# Patient Record
Sex: FEMALE | Race: BLACK OR AFRICAN AMERICAN | Employment: FULL TIME | URBAN - METROPOLITAN AREA
[De-identification: names, ages, dates, MRNs, and addresses within clinical notes are randomized per-mention and may not be internally consistent; named-entity substitution may affect disease eponyms.]

---

## 2017-01-24 RX ORDER — NORGESTIMATE-ETHINYL ESTRADIOL 7DAYSX3 28
TABLET ORAL
Qty: 28 TABLET | Refills: 2 | Status: SHIPPED | OUTPATIENT
Start: 2017-01-24 | End: 2017-04-21

## 2017-04-20 RX ORDER — NORGESTIMATE AND ETHINYL ESTRADIOL 7DAYSX3 28
1 KIT ORAL
Qty: 28 TABLET | Refills: 2 | Status: CANCELLED | OUTPATIENT
Start: 2017-04-20

## 2017-04-21 ENCOUNTER — HOSPITAL ENCOUNTER (OUTPATIENT)
Age: 22
Discharge: HOME | End: 2017-04-21
Attending: FAMILY MEDICINE
Payer: COMMERCIAL

## 2017-04-21 VITALS
OXYGEN SATURATION: 100 % | SYSTOLIC BLOOD PRESSURE: 148 MMHG | HEIGHT: 63.75 IN | HEART RATE: 105 BPM | DIASTOLIC BLOOD PRESSURE: 100 MMHG | RESPIRATION RATE: 16 BRPM | TEMPERATURE: 98 F | WEIGHT: 245 LBS | BODY MASS INDEX: 42.34 KG/M2

## 2017-04-21 DIAGNOSIS — R53.83 OTHER FATIGUE: ICD-10-CM

## 2017-04-21 DIAGNOSIS — K00.6 ERUPTION, TEETH, DISTURBANCE OF: Primary | ICD-10-CM

## 2017-04-21 DIAGNOSIS — R68.84 JAW PAIN: ICD-10-CM

## 2017-04-21 DIAGNOSIS — R03.0 ELEVATED BLOOD PRESSURE READING WITHOUT DIAGNOSIS OF HYPERTENSION: ICD-10-CM

## 2017-04-21 PROCEDURE — 81025 URINE PREGNANCY TEST: CPT | Performed by: FAMILY MEDICINE

## 2017-04-21 PROCEDURE — 99214 OFFICE O/P EST MOD 30 MIN: CPT

## 2017-04-21 PROCEDURE — 85025 COMPLETE CBC W/AUTO DIFF WBC: CPT | Performed by: FAMILY MEDICINE

## 2017-04-21 PROCEDURE — 87086 URINE CULTURE/COLONY COUNT: CPT | Performed by: FAMILY MEDICINE

## 2017-04-21 PROCEDURE — 81002 URINALYSIS NONAUTO W/O SCOPE: CPT | Performed by: FAMILY MEDICINE

## 2017-04-21 PROCEDURE — 80047 BASIC METABLC PNL IONIZED CA: CPT

## 2017-04-21 PROCEDURE — 36415 COLL VENOUS BLD VENIPUNCTURE: CPT

## 2017-04-21 RX ORDER — NORGESTIMATE-ETHINYL ESTRADIOL 7DAYSX3 28
TABLET ORAL
Qty: 28 TABLET | Refills: 11 | Status: SHIPPED | OUTPATIENT
Start: 2017-04-21 | End: 2018-03-22

## 2017-04-21 NOTE — TELEPHONE ENCOUNTER
Called the pt and advised script was sent- and she is due for a pap- she states that she made the appt today  Future Appointments  Date Time Provider Ahsan Mann   5/22/2017 3:20 PM Milana Odom MD [facility] ONEL Hanna

## 2017-04-21 NOTE — ED INITIAL ASSESSMENT (HPI)
Patient has had jaw pain to her right jaw for the past several days and now feels pressure to that side of her face. No fevers. The dizziness is more like extreme tiredness.

## 2017-04-21 NOTE — TELEPHONE ENCOUNTER
Last refilled on 1/24/17 for # 28 with 2 rf. Last seen on 4/8/16. No future appointments. Thank you.

## 2017-04-21 NOTE — ED PROVIDER NOTES
Patient Seen in: [id number] [facility] [place]    History   Patient presents with:  Jaw Pain  Dizziness (neurologic)    Stated Complaint: right ear jaw pain/headache/dizzy    HPI  20-year-old female coming in with complaints of right ear pain/jaw pain, 97.9 °F (36.6 °C)   Temp src 04/21/17 1258 Temporal   SpO2 04/21/17 1258 100 %   O2 Device 04/21/17 1258 None (Room air)       Current:/100 mmHg  Pulse 105  Temp(Src) 97.9 °F (36.6 °C) (Temporal)  Resp 16  Ht 161.9 cm (5' 3.75\")  Wt 111.131 kg  BMI and father with severe HTN and now on dialysis at 50years old from poorly controlled hypertension, I am concerned about her genetic predisposition as well. Discussed with patient her risks and lab work obtained today as well.      Both her blood pressures

## 2017-05-22 ENCOUNTER — OFFICE VISIT (OUTPATIENT)
Dept: FAMILY MEDICINE CLINIC | Facility: CLINIC | Age: 22
End: 2017-05-22

## 2017-05-22 VITALS
TEMPERATURE: 98 F | HEIGHT: 64.5 IN | WEIGHT: 245 LBS | HEART RATE: 104 BPM | DIASTOLIC BLOOD PRESSURE: 88 MMHG | RESPIRATION RATE: 14 BRPM | BODY MASS INDEX: 41.32 KG/M2 | SYSTOLIC BLOOD PRESSURE: 122 MMHG

## 2017-05-22 DIAGNOSIS — Z12.4 CERVICAL CANCER SCREENING: ICD-10-CM

## 2017-05-22 DIAGNOSIS — Z11.3 ROUTINE SCREENING FOR STI (SEXUALLY TRANSMITTED INFECTION): ICD-10-CM

## 2017-05-22 DIAGNOSIS — E66.01 MORBID OBESITY WITH BMI OF 40.0-44.9, ADULT (HCC): ICD-10-CM

## 2017-05-22 DIAGNOSIS — R73.03 PREDIABETES: ICD-10-CM

## 2017-05-22 DIAGNOSIS — Z00.00 ROUTINE HISTORY AND PHYSICAL EXAMINATION OF ADULT: Primary | ICD-10-CM

## 2017-05-22 DIAGNOSIS — E78.00 PURE HYPERCHOLESTEROLEMIA: ICD-10-CM

## 2017-05-22 DIAGNOSIS — Z23 NEED FOR VACCINATION: ICD-10-CM

## 2017-05-22 PROCEDURE — 36415 COLL VENOUS BLD VENIPUNCTURE: CPT | Performed by: FAMILY MEDICINE

## 2017-05-22 PROCEDURE — 99395 PREV VISIT EST AGE 18-39: CPT | Performed by: FAMILY MEDICINE

## 2017-05-22 PROCEDURE — 90651 9VHPV VACCINE 2/3 DOSE IM: CPT | Performed by: FAMILY MEDICINE

## 2017-05-22 PROCEDURE — 88175 CYTOPATH C/V AUTO FLUID REDO: CPT | Performed by: FAMILY MEDICINE

## 2017-05-22 PROCEDURE — 90471 IMMUNIZATION ADMIN: CPT | Performed by: FAMILY MEDICINE

## 2017-05-22 PROCEDURE — 80061 LIPID PANEL: CPT | Performed by: FAMILY MEDICINE

## 2017-05-22 PROCEDURE — 83036 HEMOGLOBIN GLYCOSYLATED A1C: CPT | Performed by: FAMILY MEDICINE

## 2017-05-22 NOTE — PROGRESS NOTES
HPI:   Cleaster Lanes is a 24year old female who presents for a complete physical exam.  Patient complains of nothing major, feeling well. Has been sexually active with the same talia for some time. No gyne concerns. No hx of PAP, no hx of STIs.     Wt Read heartburn, no nausea, no changes in BMs, no blood in stool  : denies dysuria, vaginal discharge or itching, periods fairly regular  MUSCULOSKELETAL: denies back pain, no joint pains or swelling  NEURO: denies headaches, no syncope or near syncope  PSYCHE agrees to the plan. The patient is asked to return for CPX in 1 year.   Routine history and physical examination of adult  (primary encounter diagnosis)  Cervical cancer screening  Prediabetes  Pure hypercholesterolemia  Routine screening for sti (sexually

## 2017-05-23 PROBLEM — E66.01 MORBID OBESITY WITH BMI OF 40.0-44.9, ADULT (HCC): Status: ACTIVE | Noted: 2017-05-23

## 2017-05-25 ENCOUNTER — TELEPHONE (OUTPATIENT)
Dept: FAMILY MEDICINE CLINIC | Facility: CLINIC | Age: 22
End: 2017-05-25

## 2017-05-25 RX ORDER — AZITHROMYCIN 1 G
1 PACKET (EA) ORAL ONCE
Qty: 1 EACH | Refills: 0 | Status: SHIPPED | OUTPATIENT
Start: 2017-05-25 | End: 2017-05-25

## 2017-05-25 NOTE — TELEPHONE ENCOUNTER
Health department form filled in.   Will need to complete treatment date and fax after patient received rocephin

## 2017-05-25 NOTE — TELEPHONE ENCOUNTER
Patient notified of results and MD instructions and verbalized understanding. Patient aware these are treatable infections. Discussed condom use to prevent future infections.   Patient aware no sex until 2 weeks after both she and her partner have receive

## 2017-05-25 NOTE — TELEPHONE ENCOUNTER
----- Message from Roselyn Mock MD sent at 5/24/2017 11:26 PM CDT -----  Please notify pt she tested positive for 2 STIs, gonorrhea and chlamydia. Thankfully these are treatable, but it is imperative she use condoms to try to prevent future infections.  Ne

## 2017-05-26 ENCOUNTER — NURSE ONLY (OUTPATIENT)
Dept: FAMILY MEDICINE CLINIC | Facility: CLINIC | Age: 22
End: 2017-05-26

## 2017-05-26 DIAGNOSIS — A64 STI (SEXUALLY TRANSMITTED INFECTION): Primary | ICD-10-CM

## 2017-05-26 PROCEDURE — 96372 THER/PROPH/DIAG INJ SC/IM: CPT | Performed by: FAMILY MEDICINE

## 2017-05-26 RX ORDER — METRONIDAZOLE 500 MG/1
TABLET ORAL
Qty: 4 TABLET | Refills: 0 | Status: SHIPPED | OUTPATIENT
Start: 2017-05-26 | End: 2017-10-22 | Stop reason: ALTCHOICE

## 2017-05-26 RX ORDER — CEFTRIAXONE SODIUM 250 MG/1
250 INJECTION, POWDER, FOR SOLUTION INTRAMUSCULAR; INTRAVENOUS ONCE
Status: COMPLETED | OUTPATIENT
Start: 2017-05-26 | End: 2017-05-26

## 2017-05-26 RX ADMIN — CEFTRIAXONE SODIUM 250 MG: 250 INJECTION, POWDER, FOR SOLUTION INTRAMUSCULAR; INTRAVENOUS at 15:59:00

## 2017-05-26 NOTE — PROGRESS NOTES
Injection given, advised of the + trichomonas and the meds. Verbalized understanding. Information handouts given on all 3 diseases. Reminded that she needs to contact her partner, and he will need treatment also.  She states he has [employer], I gave her

## 2017-05-26 NOTE — TELEPHONE ENCOUNTER
----- Message from Wilder Vega MD sent at 5/25/2017  6:59 PM CDT -----  Please notify pt her PAP is normal and we can repeat the PAP in 2 years, HOWEVER, the PAP shows she has another treatable STI called trichomonas.  She needs another abx to treat this,

## 2017-05-30 ENCOUNTER — TELEPHONE (OUTPATIENT)
Dept: FAMILY MEDICINE CLINIC | Facility: CLINIC | Age: 22
End: 2017-05-30

## 2017-05-30 NOTE — TELEPHONE ENCOUNTER
Called [facility] back and spoke with Maya Zamora and advised of the meds and diagnosis for this pt.  Advised that I will refax the form that we filled out- she  V/u

## 2017-10-20 ENCOUNTER — HOSPITAL ENCOUNTER (OUTPATIENT)
Age: 22
Discharge: HOME | End: 2017-10-20
Payer: COMMERCIAL

## 2017-10-20 ENCOUNTER — TELEPHONE (OUTPATIENT)
Dept: FAMILY MEDICINE CLINIC | Facility: CLINIC | Age: 22
End: 2017-10-20

## 2017-10-20 VITALS
DIASTOLIC BLOOD PRESSURE: 94 MMHG | SYSTOLIC BLOOD PRESSURE: 133 MMHG | BODY MASS INDEX: 39.87 KG/M2 | WEIGHT: 225 LBS | RESPIRATION RATE: 16 BRPM | HEART RATE: 118 BPM | OXYGEN SATURATION: 98 % | HEIGHT: 63 IN | TEMPERATURE: 98 F

## 2017-10-20 DIAGNOSIS — Z20.2 CONTACT WITH AND (SUSPECTED) EXPOSURE TO INFECTIONS WITH A PREDOMINANTLY SEXUAL MODE OF TRANSMISSION: Primary | ICD-10-CM

## 2017-10-20 PROCEDURE — 87591 N.GONORRHOEAE DNA AMP PROB: CPT | Performed by: NURSE PRACTITIONER

## 2017-10-20 PROCEDURE — 87086 URINE CULTURE/COLONY COUNT: CPT | Performed by: NURSE PRACTITIONER

## 2017-10-20 PROCEDURE — 87491 CHLMYD TRACH DNA AMP PROBE: CPT | Performed by: NURSE PRACTITIONER

## 2017-10-20 PROCEDURE — 99214 OFFICE O/P EST MOD 30 MIN: CPT

## 2017-10-20 PROCEDURE — 81002 URINALYSIS NONAUTO W/O SCOPE: CPT | Performed by: NURSE PRACTITIONER

## 2017-10-20 PROCEDURE — 81025 URINE PREGNANCY TEST: CPT | Performed by: NURSE PRACTITIONER

## 2017-10-20 PROCEDURE — 87510 GARDNER VAG DNA DIR PROBE: CPT | Performed by: NURSE PRACTITIONER

## 2017-10-20 PROCEDURE — 87660 TRICHOMONAS VAGIN DIR PROBE: CPT | Performed by: NURSE PRACTITIONER

## 2017-10-20 PROCEDURE — 87480 CANDIDA DNA DIR PROBE: CPT | Performed by: NURSE PRACTITIONER

## 2017-10-20 NOTE — ED PROVIDER NOTES
[place] [id number] [facility] [place]   Patient presents with:  Eval-G (gynecologic)    Stated Complaint: private issue    20-year-old female presents today with concerns for STD.   Patient states will have been tested and treated ab and time. She appears well-developed. HENT:   Head: Normocephalic. Neck: Normal range of motion. Neck supple. Pulmonary/Chest: Effort normal.   Abdominal: Soft. Genitourinary: There is no rash or tenderness on the right labia.  There is no rash or t

## 2017-10-20 NOTE — TELEPHONE ENCOUNTER
Patient said a few months ago she came in for her pap and was diagnosed with multiple STDs. She said her boyfriend recently came for a visit and now she is concerned about STDs again.  She said she was treated previous and is not having any symptoms but wou

## 2017-10-20 NOTE — ED INITIAL ASSESSMENT (HPI)
Had postitive sti 3 months ago and was treated with antibiotics for chlamydia/gonnorhea and bv. Also her partner was treated.  Since then symptoms have resolved, but has had unprotected sex with same partner and he went to get tested again on Wednesday and

## 2017-10-22 RX ORDER — METRONIDAZOLE 500 MG/1
500 TABLET ORAL 2 TIMES DAILY
Qty: 14 TABLET | Refills: 0 | Status: SHIPPED | OUTPATIENT
Start: 2017-10-22 | End: 2017-10-29

## 2017-10-22 NOTE — ED NOTES
Message   Received:  Today   Message Contents   Snow Herrera MD  P Os Ic Results             Results reviewed.  Patient positive bacterial vaginosis.  Metronidazole 500 mg twice daily ×7 days called to the pharmacy. [facility] inform patient to  pre

## 2017-10-27 ENCOUNTER — OFFICE VISIT (OUTPATIENT)
Dept: FAMILY MEDICINE CLINIC | Facility: CLINIC | Age: 22
End: 2017-10-27

## 2017-10-27 VITALS
BODY MASS INDEX: 46 KG/M2 | HEART RATE: 68 BPM | DIASTOLIC BLOOD PRESSURE: 70 MMHG | WEIGHT: 258 LBS | TEMPERATURE: 98 F | SYSTOLIC BLOOD PRESSURE: 124 MMHG

## 2017-10-27 DIAGNOSIS — Z09 FOLLOW-UP EXAM: Primary | ICD-10-CM

## 2017-10-27 DIAGNOSIS — Z91.89 AT RISK FOR SEXUALLY TRANSMITTED DISEASE DUE TO UNPROTECTED SEX: ICD-10-CM

## 2017-10-27 PROCEDURE — 90651 9VHPV VACCINE 2/3 DOSE IM: CPT | Performed by: FAMILY MEDICINE

## 2017-10-27 PROCEDURE — 90471 IMMUNIZATION ADMIN: CPT | Performed by: FAMILY MEDICINE

## 2017-10-27 PROCEDURE — 99213 OFFICE O/P EST LOW 20 MIN: CPT | Performed by: FAMILY MEDICINE

## 2017-10-27 NOTE — PROGRESS NOTES
Rivka Olszewski is a 25year old female. HPI:   Pt is here to f/u on STI testing. She tested positive for GC/Chlam 5/2017, was treated (as was her partner). She had not come back for re-testing as recommended.  Then a few weeks ago her partner up and sa tenderness  EXTREMITIES: no cyanosis, clubbing or edema    ASSESSMENT AND PLAN:   Diagnoses and all orders for this visit:    Follow-up exam    At risk for sexually transmitted disease due to unprotected sex  -reviewed with pt her negative GC/Chlam/trich t

## 2018-03-22 NOTE — TELEPHONE ENCOUNTER
Last refill on Trinessa #28 with 11 refills on 4 21 2017 . Last OV on 10 27 2017.   No future appointments scheduled

## 2018-03-23 RX ORDER — NORGESTIMATE-ETHINYL ESTRADIOL 7DAYSX3 28
TABLET ORAL
Qty: 28 TABLET | Refills: 11 | Status: SHIPPED | OUTPATIENT
Start: 2018-03-23 | End: 2019-02-27

## 2019-02-27 NOTE — TELEPHONE ENCOUNTER
Last refill of Trinessa - 3/23/18 - #28 with 11 refills  Last office visit - 10/27/17  Last pap - 5/22/17  No future appointments.

## 2019-02-28 RX ORDER — NORGESTIMATE AND ETHINYL ESTRADIOL 7DAYSX3 28
KIT ORAL
Qty: 28 TABLET | Refills: 1 | Status: SHIPPED | OUTPATIENT
Start: 2019-02-28 | End: 2019-03-30

## 2019-03-01 NOTE — TELEPHONE ENCOUNTER
Patient has been notified, verbalized understanding of information. Denies further questions.     Pt states she would ideally like to schedule an appt on a Saturday for pap, I did schedule appt for 4- at 3pm but if Dr. Zackary Moura opens up a Saturday she wo

## 2019-03-04 NOTE — TELEPHONE ENCOUNTER
[address] next Saturday in office is 3/30/19 - would it be okay to schedule PAP on that day? Would you like 15 or 30 min?

## 2019-03-04 NOTE — TELEPHONE ENCOUNTER
Patient notified and rescheduled appt.     Future Appointments   Date Time Provider Ahsan Mann   3/30/2019  8:30 AM Cecille Winkler MD [facility] Artemio Andrews

## 2019-03-30 ENCOUNTER — OFFICE VISIT (OUTPATIENT)
Dept: FAMILY MEDICINE CLINIC | Facility: CLINIC | Age: 24
End: 2019-03-30
Payer: COMMERCIAL

## 2019-03-30 VITALS
BODY MASS INDEX: 44.86 KG/M2 | SYSTOLIC BLOOD PRESSURE: 112 MMHG | HEIGHT: 64.5 IN | OXYGEN SATURATION: 98 % | DIASTOLIC BLOOD PRESSURE: 76 MMHG | TEMPERATURE: 97 F | WEIGHT: 266 LBS | HEART RATE: 84 BPM

## 2019-03-30 DIAGNOSIS — E66.01 MORBID OBESITY WITH BMI OF 40.0-44.9, ADULT (HCC): ICD-10-CM

## 2019-03-30 DIAGNOSIS — R73.03 PREDIABETES: ICD-10-CM

## 2019-03-30 DIAGNOSIS — Z13.220 LIPID SCREENING: ICD-10-CM

## 2019-03-30 DIAGNOSIS — Z12.4 CERVICAL CANCER SCREENING: ICD-10-CM

## 2019-03-30 DIAGNOSIS — Z00.00 ROUTINE HISTORY AND PHYSICAL EXAMINATION OF ADULT: Primary | ICD-10-CM

## 2019-03-30 DIAGNOSIS — Z13.21 ENCOUNTER FOR VITAMIN DEFICIENCY SCREENING: ICD-10-CM

## 2019-03-30 DIAGNOSIS — Z13.29 THYROID DISORDER SCREEN: ICD-10-CM

## 2019-03-30 DIAGNOSIS — Z23 NEED FOR VACCINATION: ICD-10-CM

## 2019-03-30 DIAGNOSIS — Z13.0 SCREENING, ANEMIA, DEFICIENCY, IRON: ICD-10-CM

## 2019-03-30 DIAGNOSIS — Z63.8 STRESS DUE TO FAMILY TENSION: ICD-10-CM

## 2019-03-30 DIAGNOSIS — Z11.3 ROUTINE SCREENING FOR STI (SEXUALLY TRANSMITTED INFECTION): ICD-10-CM

## 2019-03-30 PROCEDURE — 99395 PREV VISIT EST AGE 18-39: CPT | Performed by: FAMILY MEDICINE

## 2019-03-30 PROCEDURE — 90471 IMMUNIZATION ADMIN: CPT | Performed by: FAMILY MEDICINE

## 2019-03-30 PROCEDURE — 83036 HEMOGLOBIN GLYCOSYLATED A1C: CPT | Performed by: FAMILY MEDICINE

## 2019-03-30 PROCEDURE — 87591 N.GONORRHOEAE DNA AMP PROB: CPT | Performed by: FAMILY MEDICINE

## 2019-03-30 PROCEDURE — 88175 CYTOPATH C/V AUTO FLUID REDO: CPT | Performed by: FAMILY MEDICINE

## 2019-03-30 PROCEDURE — 36415 COLL VENOUS BLD VENIPUNCTURE: CPT | Performed by: FAMILY MEDICINE

## 2019-03-30 PROCEDURE — 87491 CHLMYD TRACH DNA AMP PROBE: CPT | Performed by: FAMILY MEDICINE

## 2019-03-30 PROCEDURE — 80050 GENERAL HEALTH PANEL: CPT | Performed by: FAMILY MEDICINE

## 2019-03-30 PROCEDURE — 80061 LIPID PANEL: CPT | Performed by: FAMILY MEDICINE

## 2019-03-30 PROCEDURE — 90715 TDAP VACCINE 7 YRS/> IM: CPT | Performed by: FAMILY MEDICINE

## 2019-03-30 PROCEDURE — 82306 VITAMIN D 25 HYDROXY: CPT | Performed by: FAMILY MEDICINE

## 2019-03-30 RX ORDER — NORGESTIMATE AND ETHINYL ESTRADIOL 7DAYSX3 28
1 KIT ORAL
Qty: 3 PACKAGE | Refills: 3 | Status: SHIPPED | OUTPATIENT
Start: 2019-03-30 | End: 2020-03-20

## 2019-03-30 SDOH — SOCIAL STABILITY - SOCIAL INSECURITY: OTHER SPECIFIED PROBLEMS RELATED TO PRIMARY SUPPORT GROUP: Z63.8

## 2019-03-30 NOTE — PROGRESS NOTES
HPI:   Cleaster Lanes is a 21year old female who presents for a complete physical exam.      Patient complains of nothing new, started her period yesterday.     Relationship status: monogamous dating relationship    Kids: none, has 2 siblings    Eating ha denies headaches, no syncope or near syncope  PSYCHE: denies overt depression, but more thinks about their relationship and how poor it is  HEMATOLOGIC: no bruising or noted lymph nodes    EXAM:   /76   Pulse 84   Temp 96.6 °F (35.9 °C) (Temporal) THINPREP PAP WITH HPV REFLEX REQUEST B  - THINPREP PAP WITH HPV REFLEX REQUEST; Future  - THINPREP PAP WITH HPV REFLEX REQUEST    3. Morbid obesity with BMI of 40.0-44.9, adult ([facility] [address]  Spent much of visit discussing this and importance of weight loss    4.

## 2019-04-01 ENCOUNTER — TELEPHONE (OUTPATIENT)
Dept: FAMILY MEDICINE CLINIC | Facility: CLINIC | Age: 24
End: 2019-04-01

## 2019-04-01 DIAGNOSIS — E55.9 VITAMIN D DEFICIENCY: ICD-10-CM

## 2019-04-01 DIAGNOSIS — R73.09 ELEVATED HEMOGLOBIN A1C: Primary | ICD-10-CM

## 2019-04-01 DIAGNOSIS — E78.00 ELEVATED CHOLESTEROL: ICD-10-CM

## 2019-04-01 RX ORDER — ERGOCALCIFEROL 1.25 MG/1
50000 CAPSULE ORAL WEEKLY
Qty: 12 CAPSULE | Refills: 0 | Status: SHIPPED | OUTPATIENT
Start: 2019-04-01 | End: 2019-05-01

## 2019-04-01 NOTE — TELEPHONE ENCOUNTER
----- Message from Louann Bustillo MD sent at 3/31/2019 10:59 PM CDT -----  Please notify patient her blood sugar is the highest it's been and she is neraly diabetic. Her cholestrol is also quite high. Vitamin D low.   Take prescription ergocalciferol 50,00

## 2019-04-01 NOTE — TELEPHONE ENCOUNTER
Patient advised. Verbalizes understanding. Future orders placed. Medication sent to pharmacy. Patient states she will call back if decides she needs assistance with weight loss.

## 2019-04-05 ENCOUNTER — TELEPHONE (OUTPATIENT)
Dept: FAMILY MEDICINE CLINIC | Facility: CLINIC | Age: 24
End: 2019-04-05

## 2019-04-05 NOTE — PROGRESS NOTES
Please notify patient PAP is normal, we can repeat in 2 years. Gonorrhea/chlamydia neg. Let me know if any questions.

## 2019-04-05 NOTE — TELEPHONE ENCOUNTER
----- Message from Breanna Leos MD sent at 4/5/2019 12:07 PM CDT -----  Please notify patient PAP is normal, we can repeat in 2 years. Gonorrhea/chlamydia neg. Let me know if any questions.

## 2019-04-24 ENCOUNTER — TELEPHONE (OUTPATIENT)
Dept: FAMILY MEDICINE CLINIC | Facility: CLINIC | Age: 24
End: 2019-04-24

## 2019-04-24 NOTE — TELEPHONE ENCOUNTER
Called the pharmacy and Spoke with  [place] and she is getting it ready for the pt      Pt advised

## 2019-07-01 RX ORDER — ERGOCALCIFEROL 1.25 MG/1
CAPSULE ORAL
Qty: 12 CAPSULE | Refills: 0 | OUTPATIENT
Start: 2019-07-01

## 2019-07-01 NOTE — TELEPHONE ENCOUNTER
Spoke with the pt and advised that she is due for vitamin D level prior to refilling this medication- we scheduled her a nurse visit  Future Appointments   Date Time Provider Ahsan Mann   7/13/2019  8:30 AM  [facility] [address] EMG Fallon Aguilera

## 2019-07-26 ENCOUNTER — TELEPHONE (OUTPATIENT)
Dept: FAMILY MEDICINE CLINIC | Facility: CLINIC | Age: 24
End: 2019-07-26

## 2019-08-03 ENCOUNTER — NURSE ONLY (OUTPATIENT)
Dept: FAMILY MEDICINE CLINIC | Facility: CLINIC | Age: 24
End: 2019-08-03
Payer: COMMERCIAL

## 2019-08-03 DIAGNOSIS — E55.9 VITAMIN D DEFICIENCY: ICD-10-CM

## 2019-08-03 LAB — VIT D+METAB SERPL-MCNC: 42.4 NG/ML (ref 30–100)

## 2019-08-03 PROCEDURE — 36415 COLL VENOUS BLD VENIPUNCTURE: CPT | Performed by: FAMILY MEDICINE

## 2019-08-03 PROCEDURE — 82306 VITAMIN D 25 HYDROXY: CPT | Performed by: FAMILY MEDICINE

## 2019-08-03 NOTE — PROGRESS NOTES
Cleaster Lanes presents today for nurse visit. Labs ordered by Dr César Mayo.  1 gold drawn from right ac area with 1 attempt with straight needle. Left office in stable condition.

## 2019-08-06 ENCOUNTER — TELEPHONE (OUTPATIENT)
Dept: FAMILY MEDICINE CLINIC | Facility: CLINIC | Age: 24
End: 2019-08-06

## 2019-08-06 RX ORDER — ERGOCALCIFEROL 1.25 MG/1
50000 CAPSULE ORAL
Qty: 6 CAPSULE | Refills: 3 | Status: SHIPPED | OUTPATIENT
Start: 2019-08-06 | End: 2019-11-04

## 2019-08-06 NOTE — TELEPHONE ENCOUNTER
Left message for the pt with the labs results and the recommendations- asked her to call back if she wants the prescription dose of the vitamin D

## 2019-08-06 NOTE — TELEPHONE ENCOUNTER
Pt called back and would like the prescription dose sent to the pharmacy    [facility] and [address]

## 2019-08-06 NOTE — TELEPHONE ENCOUNTER
----- Message from Nani Rizo MD sent at 8/6/2019  7:09 AM CDT -----  Vitamin D looks great now. For maintenance can either continue the scrxipt but at every other week or go to OTC Vit D3 taking 3000-4000IU daily.   When you buy a supplement make sure

## 2019-09-17 NOTE — TELEPHONE ENCOUNTER
Spoke with the pt and asked her what type if appt she is looking for since she had STI testing done today- she states that the nurse at the  told her that she needs to follow up with Dr. Radha Palomino as they don't do all the STI testing    I made her a follow up child(reddy)

## 2020-02-10 ENCOUNTER — OFFICE VISIT (OUTPATIENT)
Dept: FAMILY MEDICINE CLINIC | Facility: CLINIC | Age: 25
End: 2020-02-10
Payer: COMMERCIAL

## 2020-02-10 VITALS
TEMPERATURE: 98 F | DIASTOLIC BLOOD PRESSURE: 74 MMHG | SYSTOLIC BLOOD PRESSURE: 128 MMHG | BODY MASS INDEX: 43 KG/M2 | OXYGEN SATURATION: 95 % | HEART RATE: 91 BPM | RESPIRATION RATE: 18 BRPM | WEIGHT: 256.13 LBS

## 2020-02-10 DIAGNOSIS — E78.00 ELEVATED CHOLESTEROL: ICD-10-CM

## 2020-02-10 DIAGNOSIS — R42 DIZZINESS: ICD-10-CM

## 2020-02-10 DIAGNOSIS — R73.09 ELEVATED HEMOGLOBIN A1C: ICD-10-CM

## 2020-02-10 DIAGNOSIS — R52 BODY ACHES: ICD-10-CM

## 2020-02-10 DIAGNOSIS — R11.0 NAUSEA: ICD-10-CM

## 2020-02-10 DIAGNOSIS — R63.1 INCREASED THIRST: ICD-10-CM

## 2020-02-10 DIAGNOSIS — R35.0 INCREASED FREQUENCY OF URINATION: ICD-10-CM

## 2020-02-10 DIAGNOSIS — N64.4 BREAST TENDERNESS: ICD-10-CM

## 2020-02-10 DIAGNOSIS — N92.6 IRREGULAR PERIODS: Primary | ICD-10-CM

## 2020-02-10 LAB
ALBUMIN SERPL-MCNC: 3.8 G/DL (ref 3.4–5)
ALBUMIN/GLOB SERPL: 0.7 {RATIO} (ref 1–2)
ALP LIVER SERPL-CCNC: 76 U/L (ref 37–98)
ALT SERPL-CCNC: 42 U/L (ref 13–56)
ANION GAP SERPL CALC-SCNC: 3 MMOL/L (ref 0–18)
AST SERPL-CCNC: 34 U/L (ref 15–37)
B-HCG SERPL-ACNC: <1 MIU/ML
BASOPHILS # BLD AUTO: 0.06 X10(3) UL (ref 0–0.2)
BASOPHILS NFR BLD AUTO: 0.9 %
BILIRUB SERPL-MCNC: 0.7 MG/DL (ref 0.1–2)
BUN BLD-MCNC: 13 MG/DL (ref 7–18)
BUN/CREAT SERPL: 13.7 (ref 10–20)
CALCIUM BLD-MCNC: 9.8 MG/DL (ref 8.5–10.1)
CHLORIDE SERPL-SCNC: 107 MMOL/L (ref 98–112)
CHOLEST SMN-MCNC: 212 MG/DL (ref ?–200)
CO2 SERPL-SCNC: 28 MMOL/L (ref 21–32)
CREAT BLD-MCNC: 0.95 MG/DL (ref 0.55–1.02)
DEPRECATED RDW RBC AUTO: 40.7 FL (ref 35.1–46.3)
EOSINOPHIL # BLD AUTO: 0.19 X10(3) UL (ref 0–0.7)
EOSINOPHIL NFR BLD AUTO: 3 %
ERYTHROCYTE [DISTWIDTH] IN BLOOD BY AUTOMATED COUNT: 13.2 % (ref 11–15)
GLOBULIN PLAS-MCNC: 5.3 G/DL (ref 2.8–4.4)
GLUCOSE BLD-MCNC: 92 MG/DL (ref 70–99)
HCT VFR BLD AUTO: 42.3 % (ref 35–48)
HDLC SERPL-MCNC: 39 MG/DL (ref 40–59)
HGB BLD-MCNC: 13 G/DL (ref 12–16)
IMM GRANULOCYTES # BLD AUTO: 0.01 X10(3) UL (ref 0–1)
IMM GRANULOCYTES NFR BLD: 0.2 %
LDLC SERPL CALC-MCNC: 128 MG/DL (ref ?–100)
LYMPHOCYTES # BLD AUTO: 2.33 X10(3) UL (ref 1–4)
LYMPHOCYTES NFR BLD AUTO: 36.8 %
M PROTEIN MFR SERPL ELPH: 9.1 G/DL (ref 6.4–8.2)
MCH RBC QN AUTO: 26.1 PG (ref 26–34)
MCHC RBC AUTO-ENTMCNC: 30.7 G/DL (ref 31–37)
MCV RBC AUTO: 84.8 FL (ref 80–100)
MONOCYTES # BLD AUTO: 0.94 X10(3) UL (ref 0.1–1)
MONOCYTES NFR BLD AUTO: 14.8 %
NEUTROPHILS # BLD AUTO: 2.81 X10 (3) UL (ref 1.5–7.7)
NEUTROPHILS # BLD AUTO: 2.81 X10(3) UL (ref 1.5–7.7)
NEUTROPHILS NFR BLD AUTO: 44.3 %
NONHDLC SERPL-MCNC: 173 MG/DL (ref ?–130)
OSMOLALITY SERPL CALC.SUM OF ELEC: 286 MOSM/KG (ref 275–295)
PATIENT FASTING Y/N/NP: NO
PATIENT FASTING Y/N/NP: NO
PLATELET # BLD AUTO: 367 10(3)UL (ref 150–450)
POTASSIUM SERPL-SCNC: 4.7 MMOL/L (ref 3.5–5.1)
RBC # BLD AUTO: 4.99 X10(6)UL (ref 3.8–5.3)
SODIUM SERPL-SCNC: 138 MMOL/L (ref 136–145)
TRIGL SERPL-MCNC: 224 MG/DL (ref 30–149)
VLDLC SERPL CALC-MCNC: 45 MG/DL (ref 0–30)
WBC # BLD AUTO: 6.3 X10(3) UL (ref 4–11)

## 2020-02-10 PROCEDURE — 85025 COMPLETE CBC W/AUTO DIFF WBC: CPT | Performed by: FAMILY MEDICINE

## 2020-02-10 PROCEDURE — 84702 CHORIONIC GONADOTROPIN TEST: CPT | Performed by: FAMILY MEDICINE

## 2020-02-10 PROCEDURE — 83036 HEMOGLOBIN GLYCOSYLATED A1C: CPT | Performed by: FAMILY MEDICINE

## 2020-02-10 PROCEDURE — 36415 COLL VENOUS BLD VENIPUNCTURE: CPT | Performed by: FAMILY MEDICINE

## 2020-02-10 PROCEDURE — 99214 OFFICE O/P EST MOD 30 MIN: CPT | Performed by: FAMILY MEDICINE

## 2020-02-10 PROCEDURE — 80061 LIPID PANEL: CPT | Performed by: FAMILY MEDICINE

## 2020-02-10 PROCEDURE — 80053 COMPREHEN METABOLIC PANEL: CPT | Performed by: FAMILY MEDICINE

## 2020-02-10 NOTE — PROGRESS NOTES
Fransico Gamboa is a 25year old female. HPI:   Patient c/o irregular periods. Reports period typically comes every month on a Wed or Thurs (Jan 2nd).  This most recent period came on Saturday Feb 1st, light at first, Sunday-Tues was heavy, Wed it stopp rashes  RESPIRATORY: denies shortness of breath with exertion  CARDIOVASCULAR: denies chest pain on exertion  GI: denies abdominal pain and denies heartburn  NEURO: denies headaches    EXAM:   /74   Pulse 91   Temp 97.7 °F (36.5 °C) (Temporal)   Resp patient indicates understanding of these issues and agrees to the plan. The patient is asked to return pending results.

## 2020-02-11 ENCOUNTER — TELEPHONE (OUTPATIENT)
Dept: FAMILY MEDICINE CLINIC | Facility: CLINIC | Age: 25
End: 2020-02-11

## 2020-02-11 DIAGNOSIS — R77.9 ELEVATED BLOOD PROTEIN: Primary | ICD-10-CM

## 2020-02-11 LAB
EST. AVERAGE GLUCOSE BLD GHB EST-MCNC: 131 MG/DL (ref 68–126)
HBA1C MFR BLD HPLC: 6.2 % (ref ?–5.7)

## 2020-02-11 NOTE — TELEPHONE ENCOUNTER
Spoke with the pt and advised that Dr. Beverly Conteh has not addressed her labs and that she will have her results in the next 48 hours.   She v/u

## 2020-02-11 NOTE — TELEPHONE ENCOUNTER
Please notify patient her pregnancy test is NEGATIVE.   Blood sugar still elevated in prediabetes range, cholesterol still elevated but better than last check, blood counts normal, kidneys, liver,electrolytes normal.    Her protein level is still elevated,

## 2020-02-11 NOTE — TELEPHONE ENCOUNTER
Spoke with the pt and advised of the lab results and recommendations  She v/u  NV scheduled  Future Appointments   Date Time Provider Ahsan Mann   3/7/2020  8:30 AM  [facility] [address] EM[facility] Alvin Levin

## 2020-03-07 ENCOUNTER — NURSE ONLY (OUTPATIENT)
Dept: FAMILY MEDICINE CLINIC | Facility: CLINIC | Age: 25
End: 2020-03-07
Payer: COMMERCIAL

## 2020-03-07 DIAGNOSIS — R77.9 ELEVATED BLOOD PROTEIN: ICD-10-CM

## 2020-03-07 LAB
HBV CORE AB SERPL QL IA: NONREACTIVE
HBV SURFACE AB SER QL: NONREACTIVE
HBV SURFACE AB SERPL IA-ACNC: <3.1 MIU/ML
HBV SURFACE AG SER-ACNC: <0.1 [IU]/L
HBV SURFACE AG SERPL QL IA: NONREACTIVE
HCV AB SERPL QL IA: NONREACTIVE
T PALLIDUM AB SER QL IA: NONREACTIVE

## 2020-03-07 PROCEDURE — 84165 PROTEIN E-PHORESIS SERUM: CPT | Performed by: FAMILY MEDICINE

## 2020-03-07 PROCEDURE — 86803 HEPATITIS C AB TEST: CPT | Performed by: FAMILY MEDICINE

## 2020-03-07 PROCEDURE — 86334 IMMUNOFIX E-PHORESIS SERUM: CPT | Performed by: FAMILY MEDICINE

## 2020-03-07 PROCEDURE — 86780 TREPONEMA PALLIDUM: CPT | Performed by: FAMILY MEDICINE

## 2020-03-07 PROCEDURE — 87389 HIV-1 AG W/HIV-1&-2 AB AG IA: CPT | Performed by: FAMILY MEDICINE

## 2020-03-07 PROCEDURE — 86706 HEP B SURFACE ANTIBODY: CPT | Performed by: FAMILY MEDICINE

## 2020-03-07 PROCEDURE — 87340 HEPATITIS B SURFACE AG IA: CPT | Performed by: FAMILY MEDICINE

## 2020-03-07 PROCEDURE — 86704 HEP B CORE ANTIBODY TOTAL: CPT | Performed by: FAMILY MEDICINE

## 2020-03-07 PROCEDURE — 83883 ASSAY NEPHELOMETRY NOT SPEC: CPT | Performed by: FAMILY MEDICINE

## 2020-03-07 PROCEDURE — 36415 COLL VENOUS BLD VENIPUNCTURE: CPT | Performed by: FAMILY MEDICINE

## 2020-03-07 NOTE — PROGRESS NOTES
Patient presented today for lab draw.  5 gold, 1 pink tube(s) drawn from right ac area x1 with straight needle. Patient tolerated well.

## 2020-03-09 LAB
KAPPA FREE LIGHT CHAIN: 2.31 MG/DL (ref 0.33–1.94)
KAPPA/LAMBDA FLC RATIO: 1.54 (ref 0.26–1.65)
LAMBDA FREE LIGHT CHAIN: 1.5 MG/DL (ref 0.57–2.63)

## 2020-03-10 LAB
ALBUMIN SERPL ELPH-MCNC: 4.35 G/DL (ref 3.75–5.21)
ALBUMIN/GLOB SERPL: 1 {RATIO} (ref 1–2)
ALPHA1 GLOB SERPL ELPH-MCNC: 0.43 G/DL (ref 0.19–0.46)
ALPHA2 GLOB SERPL ELPH-MCNC: 1.11 G/DL (ref 0.48–1.05)
B-GLOBULIN SERPL ELPH-MCNC: 0.93 G/DL (ref 0.68–1.23)
GAMMA GLOB SERPL ELPH-MCNC: 1.88 G/DL (ref 0.62–1.7)
M PROTEIN MFR SERPL ELPH: 8.7 G/DL (ref 6.4–8.2)

## 2020-03-20 ENCOUNTER — PATIENT MESSAGE (OUTPATIENT)
Dept: FAMILY MEDICINE CLINIC | Facility: CLINIC | Age: 25
End: 2020-03-20

## 2020-03-20 DIAGNOSIS — R77.9 ELEVATED BLOOD PROTEIN: Primary | ICD-10-CM

## 2020-03-20 RX ORDER — NORGESTIMATE AND ETHINYL ESTRADIOL 7DAYSX3 28
KIT ORAL
Qty: 84 TABLET | Refills: 1 | Status: SHIPPED | OUTPATIENT
Start: 2020-03-20 | End: 2020-10-07 | Stop reason: SINTOL

## 2020-07-27 ENCOUNTER — APPOINTMENT (OUTPATIENT)
Dept: LAB | Age: 25
End: 2020-07-27
Attending: FAMILY MEDICINE
Payer: COMMERCIAL

## 2020-07-27 DIAGNOSIS — R77.9 ELEVATED BLOOD PROTEIN: ICD-10-CM

## 2020-07-27 DIAGNOSIS — R11.0 NAUSEA: ICD-10-CM

## 2020-07-27 LAB
ALBUMIN SERPL-MCNC: 3.6 G/DL (ref 3.4–5)
ALBUMIN/GLOB SERPL: 0.8 {RATIO} (ref 1–2)
ALP LIVER SERPL-CCNC: 92 U/L (ref 37–98)
ALT SERPL-CCNC: 31 U/L (ref 13–56)
ANION GAP SERPL CALC-SCNC: 4 MMOL/L (ref 0–18)
AST SERPL-CCNC: 26 U/L (ref 15–37)
BILIRUB SERPL-MCNC: 0.6 MG/DL (ref 0.1–2)
BUN BLD-MCNC: 11 MG/DL (ref 7–18)
BUN/CREAT SERPL: 11.8 (ref 10–20)
CALCIUM BLD-MCNC: 9.7 MG/DL (ref 8.5–10.1)
CHLORIDE SERPL-SCNC: 104 MMOL/L (ref 98–112)
CO2 SERPL-SCNC: 28 MMOL/L (ref 21–32)
CREAT BLD-MCNC: 0.93 MG/DL (ref 0.55–1.02)
GLOBULIN PLAS-MCNC: 4.7 G/DL (ref 2.8–4.4)
GLUCOSE BLD-MCNC: 102 MG/DL (ref 70–99)
M PROTEIN MFR SERPL ELPH: 8.3 G/DL (ref 6.4–8.2)
OSMOLALITY SERPL CALC.SUM OF ELEC: 282 MOSM/KG (ref 275–295)
PATIENT FASTING Y/N/NP: YES
POTASSIUM SERPL-SCNC: 4.2 MMOL/L (ref 3.5–5.1)
SODIUM SERPL-SCNC: 136 MMOL/L (ref 136–145)

## 2020-07-27 PROCEDURE — 84702 CHORIONIC GONADOTROPIN TEST: CPT | Performed by: FAMILY MEDICINE

## 2020-07-27 PROCEDURE — 36415 COLL VENOUS BLD VENIPUNCTURE: CPT | Performed by: FAMILY MEDICINE

## 2020-07-27 PROCEDURE — 80053 COMPREHEN METABOLIC PANEL: CPT | Performed by: FAMILY MEDICINE

## 2020-07-29 LAB — B-HCG SERPL-ACNC: <1 MIU/ML

## 2020-10-06 ENCOUNTER — TELEPHONE (OUTPATIENT)
Dept: FAMILY MEDICINE CLINIC | Facility: CLINIC | Age: 25
End: 2020-10-06

## 2020-10-06 NOTE — TELEPHONE ENCOUNTER
Rivka Olszewski verbally {consents to a [employer] on 10/7/20.   Patient understands and accepts financial responsibility for any deductible, co-insurance and/or co-pays associated with this service

## 2020-10-07 ENCOUNTER — TELEMEDICINE (OUTPATIENT)
Dept: FAMILY MEDICINE CLINIC | Facility: CLINIC | Age: 25
End: 2020-10-07
Payer: COMMERCIAL

## 2020-10-07 DIAGNOSIS — R10.9 RECURRENT ABDOMINAL PAIN: Primary | ICD-10-CM

## 2020-10-07 DIAGNOSIS — R14.0 ABDOMINAL BLOATING: ICD-10-CM

## 2020-10-07 DIAGNOSIS — Z30.09 COUNSELING FOR BIRTH CONTROL, ORAL CONTRACEPTIVES: ICD-10-CM

## 2020-10-07 PROCEDURE — 99214 OFFICE O/P EST MOD 30 MIN: CPT | Performed by: FAMILY MEDICINE

## 2020-10-07 RX ORDER — DROSPIRENONE AND ETHINYL ESTRADIOL 0.02-3(28)
1 KIT ORAL DAILY
Qty: 3 PACKAGE | Refills: 3 | Status: SHIPPED | OUTPATIENT
Start: 2020-10-07 | End: 2021-09-28

## 2020-10-07 NOTE — PROGRESS NOTES
Olu Miles is a 25year old female.   HPI:   Virtual Video/Telephone Check-In    Olu Miles verbally consents to a doimity video visit on 10/7/2020    Patient understands and accepts financial responsibility for any deductible, co-insurance and/or No family history on file.    Social History    Tobacco Use      Smoking status: Never Smoker      Smokeless tobacco: Never Used    Alcohol use: No    Drug use: Yes      Types: Cannabis         REVIEW OF SYSTEMS:   GENERAL HEALTH: feels well otherwise;

## 2020-12-02 ENCOUNTER — TELEPHONE (OUTPATIENT)
Dept: FAMILY MEDICINE CLINIC | Facility: CLINIC | Age: 25
End: 2020-12-02

## 2020-12-02 NOTE — TELEPHONE ENCOUNTER
Patient advised. Verbalized understanding. States still feels gassy and will get CT done. Gave # for central scheduling.

## 2020-12-02 NOTE — TELEPHONE ENCOUNTER
Patient called wanting to know if [address] wants her to have CT of abdomen and pelvis done. It was ordered 10/7/20  Advise.

## 2020-12-09 ENCOUNTER — TELEPHONE (OUTPATIENT)
Dept: FAMILY MEDICINE CLINIC | Facility: CLINIC | Age: 25
End: 2020-12-09

## 2020-12-09 ENCOUNTER — HOSPITAL ENCOUNTER (OUTPATIENT)
Dept: CT IMAGING | Age: 25
Discharge: HOME | End: 2020-12-09
Attending: FAMILY MEDICINE
Payer: COMMERCIAL

## 2020-12-09 DIAGNOSIS — Z01.818 PRE-PROCEDURAL EXAMINATION: Primary | ICD-10-CM

## 2020-12-09 DIAGNOSIS — R14.0 ABDOMINAL BLOATING: ICD-10-CM

## 2020-12-09 DIAGNOSIS — R10.9 RECURRENT ABDOMINAL PAIN: ICD-10-CM

## 2020-12-09 NOTE — TELEPHONE ENCOUNTER
Spoke with [facility] at [address] and she states that they can not get the IV on this pt  And the pt told the tech that there is a possibility that she could be pregnant    Advised that I can order the pregnancy test but the pt will need to go to the hospital to have

## 2021-04-05 ENCOUNTER — TELEPHONE (OUTPATIENT)
Dept: FAMILY MEDICINE CLINIC | Facility: CLINIC | Age: 26
End: 2021-04-05

## 2021-04-05 NOTE — TELEPHONE ENCOUNTER
[employer] message sent to patient reminding her she is due for the following:      HUYEN Haq Nurse  Pap 2 years

## 2021-06-21 ENCOUNTER — PATIENT MESSAGE (OUTPATIENT)
Dept: FAMILY MEDICINE CLINIC | Facility: CLINIC | Age: 26
End: 2021-06-21

## 2021-06-21 RX ORDER — NORGESTIMATE AND ETHINYL ESTRADIOL 7DAYSX3 28
KIT ORAL
Qty: 84 TABLET | Refills: 1 | OUTPATIENT
Start: 2021-06-21

## 2021-06-21 NOTE — TELEPHONE ENCOUNTER
Zara Kaye was most recently filled in [place] you please see why she is requesting the prior medication?

## 2021-06-21 NOTE — TELEPHONE ENCOUNTER
From: Sisi Rao  To:  Patrick Mary MD  Sent: 6/21/2021 11:52 AM CDT  Subject: Prescription Question    I received messages about my prescription being denied is there a reason why?

## 2021-06-21 NOTE — TELEPHONE ENCOUNTER
Spoke with patient and patient is trying to refill rx to soon. Explained that to patient and patient v/u.

## 2021-09-13 ENCOUNTER — OFFICE VISIT (OUTPATIENT)
Dept: FAMILY MEDICINE CLINIC | Facility: CLINIC | Age: 26
End: 2021-09-13
Payer: COMMERCIAL

## 2021-09-13 VITALS
RESPIRATION RATE: 20 BRPM | TEMPERATURE: 98 F | OXYGEN SATURATION: 99 % | WEIGHT: 246.25 LBS | HEART RATE: 103 BPM | BODY MASS INDEX: 42.56 KG/M2 | DIASTOLIC BLOOD PRESSURE: 80 MMHG | SYSTOLIC BLOOD PRESSURE: 122 MMHG | HEIGHT: 63.75 IN

## 2021-09-13 DIAGNOSIS — F22 PARANOIA (HCC): ICD-10-CM

## 2021-09-13 DIAGNOSIS — F12.20 CANNABIS USE DISORDER, SEVERE, DEPENDENCE (HCC): ICD-10-CM

## 2021-09-13 DIAGNOSIS — Z76.89 ENCOUNTER TO ESTABLISH CARE WITH NEW DOCTOR: Primary | ICD-10-CM

## 2021-09-13 DIAGNOSIS — F29 PSYCHOSIS, UNSPECIFIED PSYCHOSIS TYPE (HCC): ICD-10-CM

## 2021-09-13 PROCEDURE — 99214 OFFICE O/P EST MOD 30 MIN: CPT | Performed by: FAMILY MEDICINE

## 2021-09-13 PROCEDURE — 3074F SYST BP LT 130 MM HG: CPT | Performed by: FAMILY MEDICINE

## 2021-09-13 PROCEDURE — 3079F DIAST BP 80-89 MM HG: CPT | Performed by: FAMILY MEDICINE

## 2021-09-13 PROCEDURE — 3008F BODY MASS INDEX DOCD: CPT | Performed by: FAMILY MEDICINE

## 2021-09-13 RX ORDER — RISPERIDONE 4 MG/1
TABLET, FILM COATED ORAL
COMMUNITY
Start: 2021-09-08 | End: 2021-09-17

## 2021-09-13 RX ORDER — ATORVASTATIN CALCIUM 20 MG/1
TABLET, FILM COATED ORAL
COMMUNITY
Start: 2021-09-08 | End: 2021-09-22

## 2021-09-13 NOTE — PROGRESS NOTES
[address] [facility] Family Medicine Office Note  Chief Complaint:   Patient presents with:  Establish Care      HPI:   This is a 22year old female with her father, also a patient of mine, to establish care.      Seen in the ER on 8/31/2021 and recent hosp Dispense Refill   • atorvastatin 20 MG Oral Tab      • Drospirenone-Ethinyl Estradiol (NATALIE) 3-0.02 MG Oral Tab Take 1 tablet by mouth daily. 3 Package 3   • risperiDONE 3 MG Oral Tab Take 1 tablet (3 mg total) by mouth 2 (two) times daily.  60 tablet 0 note that they have one person on every level of the house so she cannot wander out of the house. Now she is on Risperidone 4 mg once daily, and per the patient it is making her very tired, however per the family members her symptoms have improved.      She

## 2021-09-14 ENCOUNTER — TELEPHONE (OUTPATIENT)
Dept: FAMILY MEDICINE CLINIC | Facility: CLINIC | Age: 26
End: 2021-09-14

## 2021-09-14 NOTE — TELEPHONE ENCOUNTER
Patient's father called requesting to increase medication: risperidone 4 MG Oral Tab. She is having more episodes.     Please call back # [phone]

## 2021-09-15 ENCOUNTER — TELEPHONE (OUTPATIENT)
Dept: FAMILY MEDICINE CLINIC | Facility: CLINIC | Age: 26
End: 2021-09-15

## 2021-09-15 RX ORDER — RISPERIDONE 3 MG/1
3 TABLET, FILM COATED ORAL 2 TIMES DAILY
Qty: 60 TABLET | Refills: 0 | Status: SHIPPED | OUTPATIENT
Start: 2021-09-15 | End: 2021-09-22

## 2021-09-15 NOTE — TELEPHONE ENCOUNTER
Patient's father called requesting to increase medication:       risperidone 4 MG Oral Tab.     She is having more episodes.  And not sleeping at all     Please call father back # [phone]    Thank you

## 2021-09-15 NOTE — TELEPHONE ENCOUNTER
Discussed discontinuing Risperidone 4 mg once daily and to start taking Risperidone 3 mg twice daily dosing.  ~ MM

## 2021-09-15 NOTE — TELEPHONE ENCOUNTER
Father calling requesting an update in increasing medication     Please call father back # [phone]    Thank you

## 2021-09-17 ENCOUNTER — TELEPHONE (OUTPATIENT)
Dept: FAMILY MEDICINE CLINIC | Facility: CLINIC | Age: 26
End: 2021-09-17

## 2021-09-17 PROBLEM — F29 PSYCHOSIS (HCC): Status: ACTIVE | Noted: 2021-09-17

## 2021-09-17 PROBLEM — F12.20 CANNABIS USE DISORDER, SEVERE, DEPENDENCE (HCC): Status: ACTIVE | Noted: 2021-09-17

## 2021-09-17 NOTE — TELEPHONE ENCOUNTER
Spoke with jonesu hold off on AM dose, and instead take all 6 mg (3+3) at once about 2 hours prior to bedtime, and see how she does. She should be on the medication at least once daily.  V/u.

## 2021-09-17 NOTE — TELEPHONE ENCOUNTER
Hold off on AM dose, and instead take all 6 mg (3+3) at once about 2 hours prior to bedtime, and see how she does. She should be on the medication at least once daily.  ~ JADE

## 2021-09-17 NOTE — TELEPHONE ENCOUNTER
Pt father called to discuss medication with Dr Jair Ruiz. The medication is making pt very tired. Pt was not able to complete treatment this morning because she was so tired.  Ambika Velarde wants to know if he can hold off medication until after pt completes

## 2021-09-22 ENCOUNTER — TELEPHONE (OUTPATIENT)
Dept: FAMILY MEDICINE CLINIC | Facility: CLINIC | Age: 26
End: 2021-09-22

## 2021-09-22 NOTE — TELEPHONE ENCOUNTER
Pt has questions regarding medication    atorvastatin 20 MG Oral Tab     Would like a call back at [phone]    Thank you

## 2021-09-22 NOTE — TELEPHONE ENCOUNTER
LOV 09/17/2021  Last labs lipid - 02/10/2020  Last refill on (Historical), for #?, with ? refills  atorvastatin 20 MG Oral Tab    No future appointment      Order(s) pending, please review. Thank you.   Send to [facility] [employer]

## 2021-09-26 RX ORDER — ATORVASTATIN CALCIUM 20 MG/1
20 TABLET, FILM COATED ORAL DAILY
Qty: 90 TABLET | Refills: 0 | Status: SHIPPED | OUTPATIENT
Start: 2021-09-26 | End: 2021-12-30

## 2021-09-27 ENCOUNTER — TELEPHONE (OUTPATIENT)
Dept: FAMILY MEDICINE CLINIC | Facility: CLINIC | Age: 26
End: 2021-09-27

## 2021-09-27 NOTE — TELEPHONE ENCOUNTER
Patient advised of Doctor's note below. Patient verbalized understanding.   Requesting to also schedule annual physical exam with pap at same time of lab draw - physical scheduled, pt request any time after 11:45am    Future Appointments   Date Time Provide

## 2021-09-27 NOTE — TELEPHONE ENCOUNTER
Ruth Wheatley MD [place], [facility] Nurse 12 hours ago (7:16 PM)     She will need her Lipids and A1C rechecked in the next few months to make sure Atorvastatin is working appropriately.  ~ MM (From TE - refill request for atorvastatin 09/22/2

## 2021-09-28 RX ORDER — DROSPIRENONE AND ETHINYL ESTRADIOL 0.02-3(28)
1 KIT ORAL DAILY
Qty: 28 TABLET | Refills: 12 | Status: SHIPPED | OUTPATIENT
Start: 2021-09-28 | End: 2022-09-23

## 2021-09-28 NOTE — TELEPHONE ENCOUNTER
PT CALLED CALLED AND ADV THAT SHE WAS AT [facility] AND WAS GETTING BIRTH CONTROL FILLED- WAS ADV THAT WE SHOULD OF RECEIVED A REQUEST FOR REFILL OF       TRI-SPRINTEC 0.18/0.215/0.25 MG-35 MCG Oral Tab    PT IS OUT OF MEDS.     PLEASE SEND TO [facility] LOERA

## 2021-09-28 NOTE — TELEPHONE ENCOUNTER
I believe that this is what she is on. Last refilled on 10/07/2020 for # 3 pkg with 3 rf. Last seen on 10/07/2020 telemedicine. Thank you.

## 2021-10-13 ENCOUNTER — TELEMEDICINE (OUTPATIENT)
Dept: FAMILY MEDICINE CLINIC | Facility: CLINIC | Age: 26
End: 2021-10-13

## 2021-10-13 DIAGNOSIS — R09.81 NASAL CONGESTION: ICD-10-CM

## 2021-10-13 DIAGNOSIS — R52 BODY ACHES: ICD-10-CM

## 2021-10-13 DIAGNOSIS — R05.9 COUGH: Primary | ICD-10-CM

## 2021-10-13 PROCEDURE — 99213 OFFICE O/P EST LOW 20 MIN: CPT | Performed by: NURSE PRACTITIONER

## 2021-10-13 NOTE — PROGRESS NOTES
Subjective:   Patient ID: Brenton Gallegos is a 22year old female. Brenton Gallegos  verbally consents to a [employer] on 10/13/2021.     Patient understands and accepts financial responsibility for any deductible, co-insurance and 60 tablet 0   • atorvastatin 20 MG Oral Tab Take 1 tablet (20 mg total) by mouth daily.  90 tablet 0     Allergies:  Pollen                  OTHER (SEE COMMENTS)    Comment:\"stuffy nose\"    Objective:   Physical Exam    Assessment & Plan:   Cough  (primar

## 2021-10-14 ENCOUNTER — LAB ENCOUNTER (OUTPATIENT)
Dept: LAB | Facility: HOSPITAL | Age: 26
End: 2021-10-14
Attending: NURSE PRACTITIONER
Payer: COMMERCIAL

## 2021-10-14 ENCOUNTER — TELEPHONE (OUTPATIENT)
Dept: FAMILY MEDICINE CLINIC | Facility: CLINIC | Age: 26
End: 2021-10-14

## 2021-10-14 DIAGNOSIS — R05.9 COUGH: ICD-10-CM

## 2021-10-14 DIAGNOSIS — R52 BODY ACHES: ICD-10-CM

## 2021-10-14 DIAGNOSIS — R09.81 NASAL CONGESTION: ICD-10-CM

## 2021-10-14 RX ORDER — RISPERIDONE 3 MG/1
TABLET, FILM COATED ORAL
Qty: 60 TABLET | Refills: 0 | Status: SHIPPED | OUTPATIENT
Start: 2021-10-14 | End: 2021-10-18

## 2021-10-14 NOTE — TELEPHONE ENCOUNTER
Received call from Pharmacy - wanted to clarify order for Risperidone sent 10/14/2021 - was advised this medication discontinued 9/22/21 and started on Olanzapine 09/22/2021 - wanted to verify this new Rx is correct to fill    Discussed with Dr. Dany Ingram -

## 2021-10-18 ENCOUNTER — TELEPHONE (OUTPATIENT)
Dept: FAMILY MEDICINE CLINIC | Facility: CLINIC | Age: 26
End: 2021-10-18

## 2021-10-18 NOTE — TELEPHONE ENCOUNTER
Spoke with patient-Discussed 10/14/21 note (pls see note). Patient v/u and will call psychiatrist for further information.

## 2021-10-18 NOTE — TELEPHONE ENCOUNTER
PT CALLED AND ADV CALLED THE PHARMACY AND STILL DOES NOT HAVE AUTHORIZATION ON     RISPERIDONE 3 MG Oral Tab    [facility] GARDNER     PLEASE ADV      THANK YOU

## 2021-11-11 PROBLEM — F41.9 ANXIETY DISORDER, UNSPECIFIED: Status: ACTIVE | Noted: 2021-11-11

## 2021-12-02 ENCOUNTER — TELEPHONE (OUTPATIENT)
Dept: FAMILY MEDICINE CLINIC | Facility: CLINIC | Age: 26
End: 2021-12-02

## 2021-12-02 NOTE — TELEPHONE ENCOUNTER
Future Appointments   Date Time Provider Ahsan Mann   12/6/2021  1:00 PM Suki Garcia MD [facility] Kary Callejas   12/8/2021  8:30 PM [facility] [place], [address] [place] [address]

## 2021-12-06 ENCOUNTER — OFFICE VISIT (OUTPATIENT)
Dept: FAMILY MEDICINE CLINIC | Facility: CLINIC | Age: 26
End: 2021-12-06
Payer: COMMERCIAL

## 2021-12-06 VITALS
OXYGEN SATURATION: 99 % | WEIGHT: 246.5 LBS | HEIGHT: 64 IN | DIASTOLIC BLOOD PRESSURE: 72 MMHG | SYSTOLIC BLOOD PRESSURE: 116 MMHG | HEART RATE: 87 BPM | TEMPERATURE: 98 F | RESPIRATION RATE: 18 BRPM | BODY MASS INDEX: 42.08 KG/M2

## 2021-12-06 DIAGNOSIS — Z12.4 ENCOUNTER FOR PAPANICOLAOU SMEAR FOR CERVICAL CANCER SCREENING: Primary | ICD-10-CM

## 2021-12-06 DIAGNOSIS — Z23 NEED FOR VACCINATION: ICD-10-CM

## 2021-12-06 DIAGNOSIS — Z13.29 SCREENING FOR THYROID DISORDER: ICD-10-CM

## 2021-12-06 DIAGNOSIS — Z13.0 SCREENING, ANEMIA, DEFICIENCY, IRON: ICD-10-CM

## 2021-12-06 DIAGNOSIS — E78.5 HYPERLIPIDEMIA, UNSPECIFIED HYPERLIPIDEMIA TYPE: ICD-10-CM

## 2021-12-06 DIAGNOSIS — Z11.3 ROUTINE SCREENING FOR STI (SEXUALLY TRANSMITTED INFECTION): ICD-10-CM

## 2021-12-06 DIAGNOSIS — R73.09 ELEVATED HEMOGLOBIN A1C: ICD-10-CM

## 2021-12-06 PROCEDURE — 3008F BODY MASS INDEX DOCD: CPT | Performed by: FAMILY MEDICINE

## 2021-12-06 PROCEDURE — 87510 GARDNER VAG DNA DIR PROBE: CPT | Performed by: FAMILY MEDICINE

## 2021-12-06 PROCEDURE — 99395 PREV VISIT EST AGE 18-39: CPT | Performed by: FAMILY MEDICINE

## 2021-12-06 PROCEDURE — 3074F SYST BP LT 130 MM HG: CPT | Performed by: FAMILY MEDICINE

## 2021-12-06 PROCEDURE — 88175 CYTOPATH C/V AUTO FLUID REDO: CPT | Performed by: FAMILY MEDICINE

## 2021-12-06 PROCEDURE — 87480 CANDIDA DNA DIR PROBE: CPT | Performed by: FAMILY MEDICINE

## 2021-12-06 PROCEDURE — 87491 CHLMYD TRACH DNA AMP PROBE: CPT | Performed by: FAMILY MEDICINE

## 2021-12-06 PROCEDURE — 87660 TRICHOMONAS VAGIN DIR PROBE: CPT | Performed by: FAMILY MEDICINE

## 2021-12-06 PROCEDURE — 87591 N.GONORRHOEAE DNA AMP PROB: CPT | Performed by: FAMILY MEDICINE

## 2021-12-06 PROCEDURE — 3078F DIAST BP <80 MM HG: CPT | Performed by: FAMILY MEDICINE

## 2021-12-06 NOTE — PROGRESS NOTES
[facility] Family Medicine Office Note  Chief Complaint:   Patient presents with:  Physical: States medication prescribed by Psych and making her very tired, mood changes and voices are still there but not there.   Pap  Lab      HPI:   This is a 2 Answered       REVIEW OF SYSTEMS:     All other review of systems are negative except for as mentioned in the HPI      EXAM:   /72   Pulse 87   Temp 97.9 °F (36.6 °C) (Temporal)   Resp 18   Ht 5' 4\" (1.626 m)   Wt 246 lb 8 oz (111.8 kg)   LMP 11/29/ VAGINITIS/VAGINOSIS, DNA PROBE    3. Elevated hemoglobin V7U  - COMP METABOLIC PANEL (14); Future  - HEMOGLOBIN A1C; Future    4. Screening, anemia, deficiency, iron  - CBC WITH DIFFERENTIAL WITH PLATELET; Future    5.  Hyperlipidemia, unspecified hyperlipi List:     Morbid obesity with BMI of 40.0-44.9, adult ([facility] [address]     Psychosis ([facility] [address]     Cannabis use disorder, severe, dependence ([facility]     Anxiety disorder, unspecified

## 2021-12-07 RX ORDER — METRONIDAZOLE 7.5 MG/G
1 GEL VAGINAL NIGHTLY
Qty: 70 G | Refills: 0 | Status: SHIPPED | OUTPATIENT
Start: 2021-12-07 | End: 2021-12-12

## 2021-12-08 ENCOUNTER — NURSE ONLY (OUTPATIENT)
Dept: FAMILY MEDICINE CLINIC | Facility: CLINIC | Age: 26
End: 2021-12-08
Payer: COMMERCIAL

## 2021-12-08 DIAGNOSIS — E78.5 HYPERLIPIDEMIA, UNSPECIFIED HYPERLIPIDEMIA TYPE: ICD-10-CM

## 2021-12-08 DIAGNOSIS — Z13.29 SCREENING FOR THYROID DISORDER: ICD-10-CM

## 2021-12-08 DIAGNOSIS — Z13.0 SCREENING, ANEMIA, DEFICIENCY, IRON: ICD-10-CM

## 2021-12-08 DIAGNOSIS — R73.09 ELEVATED HEMOGLOBIN A1C: ICD-10-CM

## 2021-12-08 PROCEDURE — 83036 HEMOGLOBIN GLYCOSYLATED A1C: CPT | Performed by: FAMILY MEDICINE

## 2021-12-08 PROCEDURE — 80050 GENERAL HEALTH PANEL: CPT | Performed by: FAMILY MEDICINE

## 2021-12-08 PROCEDURE — 80061 LIPID PANEL: CPT | Performed by: FAMILY MEDICINE

## 2021-12-08 NOTE — PROGRESS NOTES
Fransico Gamboa present in office for nurse visit. Labs as ordered by Dr. Angelito Elizalde; 23 g, Right AC, lavender tube and gold tubex2     All questions/concerns addressed. Patient left in stable condition.

## 2021-12-13 ENCOUNTER — TELEPHONE (OUTPATIENT)
Dept: FAMILY MEDICINE CLINIC | Facility: CLINIC | Age: 26
End: 2021-12-13

## 2021-12-13 NOTE — TELEPHONE ENCOUNTER
----- Message from Ruth Irby MD sent at 12/12/2021  4:25 PM CST -----  Please call patient and inform her that her cholesterol looks great, and the Atorvastatin is definitely helping with this.   Continue with the statin, and strongly monit

## 2021-12-30 RX ORDER — ATORVASTATIN CALCIUM 20 MG/1
TABLET, FILM COATED ORAL
Qty: 90 TABLET | Refills: 0 | Status: SHIPPED | OUTPATIENT
Start: 2021-12-30

## 2021-12-30 NOTE — TELEPHONE ENCOUNTER
Cholesterol Medication Protocol Passed 12/29/2021 10:58 PM   Protocol Details  ALT < 80    ALT resulted within past year    Lipid panel within past 12 months    Appointment within past 12 or next 3 months     Refilled per protocol  atorvastatin 20 MG Oral

## 2022-03-25 RX ORDER — ATORVASTATIN CALCIUM 20 MG/1
TABLET, FILM COATED ORAL
Qty: 90 TABLET | Refills: 1 | Status: SHIPPED | OUTPATIENT
Start: 2022-03-25

## 2022-09-06 RX ORDER — DROSPIRENONE AND ETHINYL ESTRADIOL 0.02-3(28)
1 KIT ORAL DAILY
Qty: 28 TABLET | Refills: 5 | Status: SHIPPED | OUTPATIENT
Start: 2022-09-06 | End: 2023-09-01

## 2022-09-15 ENCOUNTER — OFFICE VISIT (OUTPATIENT)
Dept: FAMILY MEDICINE CLINIC | Facility: CLINIC | Age: 27
End: 2022-09-15
Payer: COMMERCIAL

## 2022-09-15 VITALS
RESPIRATION RATE: 17 BRPM | OXYGEN SATURATION: 98 % | HEIGHT: 64 IN | BODY MASS INDEX: 44.73 KG/M2 | HEART RATE: 87 BPM | WEIGHT: 262 LBS | TEMPERATURE: 97 F

## 2022-09-15 DIAGNOSIS — S46.812A TRAPEZIUS MUSCLE STRAIN, LEFT, INITIAL ENCOUNTER: Primary | ICD-10-CM

## 2022-09-15 DIAGNOSIS — M62.89 MUSCLE STIFFNESS: ICD-10-CM

## 2022-09-15 DIAGNOSIS — E78.5 HYPERLIPIDEMIA, UNSPECIFIED HYPERLIPIDEMIA TYPE: ICD-10-CM

## 2022-09-15 DIAGNOSIS — Z51.81 MEDICATION MONITORING ENCOUNTER: ICD-10-CM

## 2022-09-15 PROCEDURE — 99214 OFFICE O/P EST MOD 30 MIN: CPT | Performed by: FAMILY MEDICINE

## 2022-09-15 PROCEDURE — 3008F BODY MASS INDEX DOCD: CPT | Performed by: FAMILY MEDICINE

## 2022-09-15 RX ORDER — METAXALONE 800 MG/1
800 TABLET ORAL 2 TIMES DAILY
Qty: 20 TABLET | Refills: 0 | Status: SHIPPED | OUTPATIENT
Start: 2022-09-15 | End: 2022-09-25

## 2022-09-15 RX ORDER — ATORVASTATIN CALCIUM 10 MG/1
10 TABLET, FILM COATED ORAL NIGHTLY
Qty: 90 TABLET | Refills: 0 | Status: SHIPPED | OUTPATIENT
Start: 2022-09-15 | End: 2022-12-14

## 2022-09-21 RX ORDER — ATORVASTATIN CALCIUM 20 MG/1
TABLET, FILM COATED ORAL
Qty: 90 TABLET | Refills: 1 | OUTPATIENT
Start: 2022-09-21

## 2022-10-25 ENCOUNTER — APPOINTMENT (OUTPATIENT)
Dept: CT IMAGING | Facility: HOSPITAL | Age: 27
End: 2022-10-25
Attending: EMERGENCY MEDICINE
Payer: COMMERCIAL

## 2022-10-25 ENCOUNTER — HOSPITAL ENCOUNTER (OUTPATIENT)
Facility: HOSPITAL | Age: 27
Setting detail: OBSERVATION
Discharge: NURSING, REHAB OR LONG-TERM CARE | End: 2022-10-28
Attending: EMERGENCY MEDICINE | Admitting: HOSPITALIST
Payer: COMMERCIAL

## 2022-10-25 DIAGNOSIS — R41.82 ALTERED MENTAL STATUS, UNSPECIFIED ALTERED MENTAL STATUS TYPE: Primary | ICD-10-CM

## 2022-10-25 LAB
ALBUMIN SERPL-MCNC: 3.9 G/DL (ref 3.4–5)
ALBUMIN/GLOB SERPL: 0.8 {RATIO} (ref 1–2)
ALP LIVER SERPL-CCNC: 78 U/L
ALT SERPL-CCNC: 49 U/L
ANION GAP SERPL CALC-SCNC: 7 MMOL/L (ref 0–18)
AST SERPL-CCNC: 40 U/L (ref 15–37)
ATRIAL RATE: 113 BPM
BASOPHILS # BLD AUTO: 0.03 X10(3) UL (ref 0–0.2)
BASOPHILS NFR BLD AUTO: 0.4 %
BILIRUB SERPL-MCNC: 0.8 MG/DL (ref 0.1–2)
BUN BLD-MCNC: 12 MG/DL (ref 7–18)
CALCIUM BLD-MCNC: 9.5 MG/DL (ref 8.5–10.1)
CHLORIDE SERPL-SCNC: 108 MMOL/L (ref 98–112)
CO2 SERPL-SCNC: 23 MMOL/L (ref 21–32)
CREAT BLD-MCNC: 0.92 MG/DL
EOSINOPHIL # BLD AUTO: 0.01 X10(3) UL (ref 0–0.7)
EOSINOPHIL NFR BLD AUTO: 0.1 %
ERYTHROCYTE [DISTWIDTH] IN BLOOD BY AUTOMATED COUNT: 13.2 %
ETHANOL SERPL-MCNC: <3 MG/DL (ref ?–3)
GFR SERPLBLD BASED ON 1.73 SQ M-ARVRAT: 88 ML/MIN/1.73M2 (ref 60–?)
GLOBULIN PLAS-MCNC: 5 G/DL (ref 2.8–4.4)
GLUCOSE BLD-MCNC: 107 MG/DL (ref 70–99)
GLUCOSE BLD-MCNC: 109 MG/DL (ref 70–99)
HCG SERPL QL: NEGATIVE
HCT VFR BLD AUTO: 39.9 %
HGB BLD-MCNC: 13.2 G/DL
IMM GRANULOCYTES # BLD AUTO: 0.02 X10(3) UL (ref 0–1)
IMM GRANULOCYTES NFR BLD: 0.3 %
LYMPHOCYTES # BLD AUTO: 1.7 X10(3) UL (ref 1–4)
LYMPHOCYTES NFR BLD AUTO: 21.5 %
MCH RBC QN AUTO: 26.8 PG (ref 26–34)
MCHC RBC AUTO-ENTMCNC: 33.1 G/DL (ref 31–37)
MCV RBC AUTO: 80.9 FL
MONOCYTES # BLD AUTO: 0.76 X10(3) UL (ref 0.1–1)
MONOCYTES NFR BLD AUTO: 9.6 %
NEUTROPHILS # BLD AUTO: 5.39 X10 (3) UL (ref 1.5–7.7)
NEUTROPHILS # BLD AUTO: 5.39 X10(3) UL (ref 1.5–7.7)
NEUTROPHILS NFR BLD AUTO: 68.1 %
OSMOLALITY SERPL CALC.SUM OF ELEC: 286 MOSM/KG (ref 275–295)
P AXIS: 70 DEGREES
P-R INTERVAL: 128 MS
PLATELET # BLD AUTO: 318 10(3)UL (ref 150–450)
POTASSIUM SERPL-SCNC: 3.9 MMOL/L (ref 3.5–5.1)
PROT SERPL-MCNC: 8.9 G/DL (ref 6.4–8.2)
Q-T INTERVAL: 346 MS
QRS DURATION: 90 MS
QTC CALCULATION (BEZET): 474 MS
R AXIS: 69 DEGREES
RBC # BLD AUTO: 4.93 X10(6)UL
SODIUM SERPL-SCNC: 138 MMOL/L (ref 136–145)
T AXIS: -24 DEGREES
VENTRICULAR RATE: 113 BPM
WBC # BLD AUTO: 7.9 X10(3) UL (ref 4–11)

## 2022-10-25 PROCEDURE — 70450 CT HEAD/BRAIN W/O DYE: CPT | Performed by: EMERGENCY MEDICINE

## 2022-10-25 PROCEDURE — 99220 INITIAL OBSERVATION CARE,LEVL III: CPT | Performed by: HOSPITALIST

## 2022-10-25 RX ORDER — ZIPRASIDONE MESYLATE 20 MG/ML
20 INJECTION, POWDER, LYOPHILIZED, FOR SOLUTION INTRAMUSCULAR EVERY 2 HOUR PRN
Status: DISCONTINUED | OUTPATIENT
Start: 2022-10-25 | End: 2022-10-25

## 2022-10-25 RX ORDER — BISACODYL 10 MG
10 SUPPOSITORY, RECTAL RECTAL
Status: DISCONTINUED | OUTPATIENT
Start: 2022-10-25 | End: 2022-10-28

## 2022-10-25 RX ORDER — ONDANSETRON 2 MG/ML
4 INJECTION INTRAMUSCULAR; INTRAVENOUS EVERY 6 HOURS PRN
Status: DISCONTINUED | OUTPATIENT
Start: 2022-10-25 | End: 2022-10-28

## 2022-10-25 RX ORDER — POLYETHYLENE GLYCOL 3350 17 G/17G
17 POWDER, FOR SOLUTION ORAL DAILY PRN
Status: DISCONTINUED | OUTPATIENT
Start: 2022-10-25 | End: 2022-10-28

## 2022-10-25 RX ORDER — ACETAMINOPHEN 500 MG
1000 TABLET ORAL EVERY 4 HOURS PRN
Status: DISCONTINUED | OUTPATIENT
Start: 2022-10-25 | End: 2022-10-28

## 2022-10-25 RX ORDER — ATORVASTATIN CALCIUM 10 MG/1
10 TABLET, FILM COATED ORAL NIGHTLY
Status: DISCONTINUED | OUTPATIENT
Start: 2022-10-25 | End: 2022-10-28

## 2022-10-25 RX ORDER — DROSPIRENONE AND ETHINYL ESTRADIOL 0.02-3(28)
1 KIT ORAL DAILY
Status: DISCONTINUED | OUTPATIENT
Start: 2022-10-26 | End: 2022-10-25

## 2022-10-25 RX ORDER — ZIPRASIDONE HYDROCHLORIDE 20 MG/1
20 CAPSULE ORAL 2 TIMES DAILY WITH MEALS
Status: DISCONTINUED | OUTPATIENT
Start: 2022-10-25 | End: 2022-10-25

## 2022-10-25 RX ORDER — METOPROLOL TARTRATE 5 MG/5ML
5 INJECTION INTRAVENOUS EVERY 6 HOURS PRN
Status: DISCONTINUED | OUTPATIENT
Start: 2022-10-25 | End: 2022-10-28

## 2022-10-25 RX ORDER — SODIUM PHOSPHATE, DIBASIC AND SODIUM PHOSPHATE, MONOBASIC 7; 19 G/133ML; G/133ML
1 ENEMA RECTAL ONCE AS NEEDED
Status: DISCONTINUED | OUTPATIENT
Start: 2022-10-25 | End: 2022-10-28

## 2022-10-25 RX ORDER — SENNOSIDES 8.6 MG
17.2 TABLET ORAL NIGHTLY PRN
Status: DISCONTINUED | OUTPATIENT
Start: 2022-10-25 | End: 2022-10-28

## 2022-10-25 RX ORDER — PROCHLORPERAZINE EDISYLATE 5 MG/ML
5 INJECTION INTRAMUSCULAR; INTRAVENOUS EVERY 8 HOURS PRN
Status: DISCONTINUED | OUTPATIENT
Start: 2022-10-25 | End: 2022-10-28

## 2022-10-25 RX ORDER — ATORVASTATIN CALCIUM 10 MG/1
10 TABLET, FILM COATED ORAL NIGHTLY
Status: DISCONTINUED | OUTPATIENT
Start: 2022-10-25 | End: 2022-10-25

## 2022-10-25 RX ORDER — DROSPIRENONE AND ETHINYL ESTRADIOL 0.02-3(28)
1 KIT ORAL DAILY
Status: DISCONTINUED | OUTPATIENT
Start: 2022-10-26 | End: 2022-10-28

## 2022-10-25 RX ORDER — MELATONIN
3 NIGHTLY PRN
Status: DISCONTINUED | OUTPATIENT
Start: 2022-10-25 | End: 2022-10-28

## 2022-10-25 RX ORDER — ECHINACEA PURPUREA EXTRACT 125 MG
1 TABLET ORAL
Status: DISCONTINUED | OUTPATIENT
Start: 2022-10-25 | End: 2022-10-28

## 2022-10-25 NOTE — ED INITIAL ASSESSMENT (HPI)
Patient to ED via EMS from [facility]. Reports they checked on the patient today, patient was \"unresponsive\" and had urinated on herself. Patient was given Ativan and Geodon   Upon EMS arrival the patient was awake, only responding to pain, on her belly.      Staff from [facility] is with the patient

## 2022-10-25 NOTE — ED QUICK NOTES
Orders for admission, patient is aware of plan and ready to go upstairs. Any questions, please call ED RN RAMANDEEP at extension [phone]. Patient Covid vaccination status: Fully vaccinated     COVID Test Ordered in ED: None    COVID Suspicion at Admission: N/A    Running Infusions:  None    Mental Status/LOC at time of transport: AXO X1    Other pertinent information: VERY SLEEPY, REFUSES TO OPEN HER EYES UNLESS THERE IS PAINFUL STIMULI. LOLLY SITTER AT BEDSIDE. ON ROOM AIR.    CIWA score: N/A   NIH score:  N/A

## 2022-10-26 ENCOUNTER — NURSE ONLY (OUTPATIENT)
Dept: ELECTROPHYSIOLOGY | Facility: HOSPITAL | Age: 27
End: 2022-10-26
Attending: HOSPITALIST
Payer: COMMERCIAL

## 2022-10-26 LAB
ATRIAL RATE: 136 BPM
P AXIS: 57 DEGREES
P-R INTERVAL: 132 MS
Q-T INTERVAL: 288 MS
QRS DURATION: 78 MS
QTC CALCULATION (BEZET): 433 MS
R AXIS: 51 DEGREES
T AXIS: -36 DEGREES
VENTRICULAR RATE: 136 BPM

## 2022-10-26 PROCEDURE — 99225 SUBSEQUENT OBSERVATION CARE: CPT | Performed by: HOSPITALIST

## 2022-10-26 PROCEDURE — 99245 OFF/OP CONSLTJ NEW/EST HI 55: CPT | Performed by: OTHER

## 2022-10-26 PROCEDURE — 90792 PSYCH DIAG EVAL W/MED SRVCS: CPT | Performed by: OTHER

## 2022-10-26 RX ORDER — DIAZEPAM 5 MG/ML
5 INJECTION, SOLUTION INTRAMUSCULAR; INTRAVENOUS ONCE
Status: DISCONTINUED | OUTPATIENT
Start: 2022-10-26 | End: 2022-10-28

## 2022-10-26 RX ORDER — ZIPRASIDONE HYDROCHLORIDE 20 MG/1
20 CAPSULE ORAL 2 TIMES DAILY WITH MEALS
Status: DISCONTINUED | OUTPATIENT
Start: 2022-10-26 | End: 2022-10-28

## 2022-10-26 RX ORDER — DIAZEPAM 5 MG/ML
5 INJECTION, SOLUTION INTRAMUSCULAR; INTRAVENOUS EVERY 6 HOURS PRN
Status: DISCONTINUED | OUTPATIENT
Start: 2022-10-26 | End: 2022-10-28

## 2022-10-26 RX ORDER — HALOPERIDOL 5 MG/ML
2 INJECTION INTRAMUSCULAR EVERY 6 HOURS PRN
Status: DISCONTINUED | OUTPATIENT
Start: 2022-10-26 | End: 2022-10-28

## 2022-10-26 RX ORDER — CLONAZEPAM 0.5 MG/1
0.5 TABLET ORAL 2 TIMES DAILY
Status: DISCONTINUED | OUTPATIENT
Start: 2022-10-26 | End: 2022-10-28

## 2022-10-26 NOTE — PLAN OF CARE
Assumed patient care for AM shift:  - Patient pacing in the room  - Unwilling to take second dose of PRN meds for heart rate overnight  - Sitter at bedside for SI, from [facility]  - Patient suspicious of staff and of meds, refusing care at times  - EEG ordered, patient agreed to test this morning.  - Neuro, psych, and hosp following.

## 2022-10-26 NOTE — PROGRESS NOTES
PSYCH CONSULT    Date of Admission: 10/25/22  Date of Consult: 10/26/22  Reason for Consultation: Altered mental status, psychosis    Impression:  Primary Psychiatric Diagnosis:  Acute delirium/encephalopathy due to cannabis intoxication and possibly meds (Klonipin, Geodon). IMPROVED. Psychosis unspecified with paranoid delusions suspected hallucinations (auditory and visual). She appears to be responding to internal stimuli. Depressive disorder unspecified. Cannabis use disorder, relapsed last week. Rule Out Diagnoses:  Substance induced psychosis. Stress induced psychosis. Broke up with her boyfriend 2 wks ago. Psychotic depression. Schizophrenia. Personality Traits:  Deferred     Pertinent Medical Diagnoses:  Obesity. Recommendations:  1) Urine drug screen. 2) Restart Geodon 20mg twice a day to treat agitation and psychosis. 3) Restart Klonipin at 0.5mg twice a day for anxiety. 4) Start Valium 5mg IV every 6 hrs PRN anxiety. 5) Start Haldol 2mg IV every 6 hrs PRN agitation/psychosis. 6) When medically clear, transfer to inpatient psych unit. Full note to follow.     Dr. Nate Sheffield

## 2022-10-26 NOTE — BH PROGRESS NOTE
Went to see the patient to go over her rights and voluntary. Explained these to her. She stated an understanding and signed all the forms. She received a copy of each and they were up loaded to this record.

## 2022-10-26 NOTE — PROGRESS NOTES
Admitted to unit from ED with Altered Mental Status. Unable to complete admission navigator due to patient's refusal to answer questions.  in place for S:I  On R/A  ST on tele. Elevated HR relayed to MD New orders recieved  Denies pain. Refused oral medications. Patient continued to cover her mouth with her hands, and refused to remove her face mask. When asked she she had her hands over her mouth she responded\"my breath stinks\"  Oral care offered but patient refused. HR remains elevated, sustaining in the 120's, even after metoprolol 5mg IV. MD updated. NNO. Medication offered again to pt at 5624 but was refused despite education.

## 2022-10-26 NOTE — OCCUPATIONAL THERAPY NOTE
Received order for OT evaluation. Chart reviewed. Spoke with RN. Independent with mobility and ADL. No skilled OT needs at this time. Will sign off.

## 2022-10-26 NOTE — PHYSICAL THERAPY NOTE
Physical Therapy    Orders for PT eval received. After chart review and discussion w/ rn, pt does not require skilled PT intervention at this time. Will sign off of case, please re-order if mobility needs arise.

## 2022-10-26 NOTE — PROCEDURES
ELECTROENCEPHALOGRAM REPORT    Patient Name: Nancy Greenberg  Study Date:10/26/22  Patient Diagnosis: Seizure    TECHNICAL SUMMARY:  An ambulatory EEG was obtained. There was no sedation given. The routine 16 channel EEG was performed with bipolar connections using an anterior to posterior double-banana montage. Seizure software stored files described as automatic seizure detections, automated interictal epileptiform detections (spikes). BACKGROUND:  The dominant posterior rhythm consisted of 9 Hertz, medium amplitude, and well-regulated, symmetric waves. There was normal attenuation to eye opening maneuvers. SLOWING:  None    EPILEPTIC DISCHARGES:  None    INTERPRETATION:  This is a normal awake and drowsy EEG. No seizures or epileptiform discharges seen during this recording.      Clinical correlation is advised    Cheko Natarajan MD  10/26/22 9:13 AM

## 2022-10-27 LAB
AMPHET UR QL SCN: NEGATIVE
AMPHET UR QL SCN: NEGATIVE
BARBITURATES UR QL SCN: NEGATIVE
BENZODIAZ UR QL SCN: NEGATIVE
BENZODIAZ UR QL SCN: NEGATIVE
COCAINE UR QL: NEGATIVE
COCAINE UR QL: NEGATIVE
CREAT UR-SCNC: 321 MG/DL
CREAT UR-SCNC: 321 MG/DL
ETHANOL UR-MCNC: NEGATIVE MG/DL
MDMA UR QL SCN: NEGATIVE
OPIATES UR QL SCN: NEGATIVE
OPIATES UR QL SCN: NEGATIVE
OXYCODONE UR QL SCN: NEGATIVE
PCP UR QL SCN: NEGATIVE

## 2022-10-27 PROCEDURE — 99225 SUBSEQUENT OBSERVATION CARE: CPT | Performed by: HOSPITALIST

## 2022-10-27 PROCEDURE — 99225 SUBSEQUENT OBSERVATION CARE: CPT | Performed by: OTHER

## 2022-10-27 RX ORDER — PROPRANOLOL HYDROCHLORIDE 20 MG/1
20 TABLET ORAL 2 TIMES DAILY
Status: DISCONTINUED | OUTPATIENT
Start: 2022-10-27 | End: 2022-10-28

## 2022-10-27 RX ORDER — CLONAZEPAM 0.5 MG/1
0.5 TABLET ORAL 2 TIMES DAILY
Refills: 0 | Status: ON HOLD | COMMUNITY
Start: 2022-10-27

## 2022-10-27 NOTE — PLAN OF CARE
Assumed pt care at 0730. A&Ox3, disoriented to situation. Anxious at times. VSS. Room air. NSR/ST on tele. 1:1 sitter at bedside for suicide precautions. Seizure precautions maintained, no seizure-like activity noted. L AC PIV SL. Denies pain currently, denies N/V. Regular diet. Voiding, up x1 assist to bathroom. Medically cleared per hospitalist. Pt updated with POC.      Problem: Patient/Family Goals  Goal: Patient/Family Long Term Goal  Description: Patient's Long Term Goal: discharge  Interventions:  - be medically cleared  - transfer back to [facility]  - See additional Care Plan goals for specific interventions  Outcome: Progressing  Goal: Patient/Family Short Term Goal  Description: Patient's Short Term Goal: to feel less anxious   Interventions:   - be compliant w/ medications  - psych consult  - See additional Care Plan goals for specific interventions  Outcome: Progressing     Problem: PAIN - ADULT  Goal: Verbalizes/displays adequate comfort level or patient's stated pain goal  Description: INTERVENTIONS:  - Encourage pt to monitor pain and request assistance  - Assess pain using appropriate pain scale  - Administer analgesics based on type and severity of pain and evaluate response  - Implement non-pharmacological measures as appropriate and evaluate response  - Consider cultural and social influences on pain and pain management  - Manage/alleviate anxiety  - Utilize distraction and/or relaxation techniques  - Monitor for opioid side effects  - Notify MD/LIP if interventions unsuccessful or patient reports new pain  - Anticipate increased pain with activity and pre-medicate as appropriate  Outcome: Progressing     Problem: RISK FOR INFECTION - ADULT  Goal: Absence of fever/infection during anticipated neutropenic period  Description: INTERVENTIONS  - Monitor WBC  - Administer growth factors as ordered  - Implement neutropenic guidelines  Outcome: Progressing     Problem: SAFETY ADULT - FALL  Goal: Free from fall injury  Description: INTERVENTIONS:  - Assess pt frequently for physical needs  - Identify cognitive and physical deficits and behaviors that affect risk of falls.   - [place] fall precautions as indicated by assessment.  - Educate pt/family on patient safety including physical limitations  - Instruct pt to call for assistance with activity based on assessment  - Modify environment to reduce risk of injury  - Provide assistive devices as appropriate  - Consider OT/PT consult to assist with strengthening/mobility  - Encourage toileting schedule  Outcome: Progressing     Problem: DISCHARGE PLANNING  Goal: Discharge to home or other facility with appropriate resources  Description: INTERVENTIONS:  - Identify barriers to discharge w/pt and caregiver  - Include patient/family/discharge partner in discharge planning  - Arrange for needed discharge resources and transportation as appropriate  - Identify discharge learning needs (meds, wound care, etc)  - Arrange for interpreters to assist at discharge as needed  - Consider post-discharge preferences of patient/family/discharge partner  - Complete POLST form as appropriate  - Assess patient's ability to be responsible for managing their own health  - Refer to Case Management Department for coordinating discharge planning if the patient needs post-hospital services based on physician/LIP order or complex needs related to functional status, cognitive ability or social support system  Outcome: Progressing

## 2022-10-27 NOTE — PLAN OF CARE
Assumed care at [address]. Alert and oriented x4, delusional at times with Haldol given. Telemetry monitor reading ST. No pain. Fall precautions maintained per protocol. Call light in reach at bedside. Will continue to monitor.

## 2022-10-27 NOTE — CM/SW NOTE
[facility] [place] state no bed today , MD Wood requested pt be transferred out if no bed is guaranteed for Friday.  MSW await [facility] [place] staff to f/u with that request.    2:20pm  Per CR from LOLLY-the pt can admit on 10/28

## 2022-10-28 VITALS
HEIGHT: 64 IN | WEIGHT: 239.88 LBS | DIASTOLIC BLOOD PRESSURE: 81 MMHG | HEART RATE: 61 BPM | SYSTOLIC BLOOD PRESSURE: 138 MMHG | TEMPERATURE: 98 F | BODY MASS INDEX: 40.95 KG/M2 | OXYGEN SATURATION: 97 % | RESPIRATION RATE: 15 BRPM

## 2022-10-28 PROBLEM — F33.3 MDD (MAJOR DEPRESSIVE DISORDER), RECURRENT, SEVERE, WITH PSYCHOSIS (HCC): Status: ACTIVE | Noted: 2022-10-28

## 2022-10-28 LAB — SARS-COV-2 RNA RESP QL NAA+PROBE: NOT DETECTED

## 2022-10-28 PROCEDURE — 99217 OBSERVATION CARE DISCHARGE: CPT | Performed by: HOSPITALIST

## 2022-10-28 PROCEDURE — 99225 SUBSEQUENT OBSERVATION CARE: CPT | Performed by: OTHER

## 2022-10-28 RX ORDER — PROPRANOLOL HYDROCHLORIDE 20 MG/1
20 TABLET ORAL 2 TIMES DAILY
Qty: 60 TABLET | Refills: 0 | Status: ON HOLD | OUTPATIENT
Start: 2022-10-28

## 2022-10-28 NOTE — PROGRESS NOTES
NURSING DISCHARGE NOTE    Discharged Another hospital via Ambulance. Accompanied by Support staff  Belongings Taken by patient/family.     Discussed discharge plans with patient  Report given to Ines at [facility]  C&P original and copy given to ambulance    Patient medically stable to discharge

## 2022-10-28 NOTE — PLAN OF CARE
Assumed care 1930. A/O x 3. RA. NSR on tele. Denies pain. SI and seizure prec in place. 1-1 care companion at bedside. Non cardiac elec protocol. Regular diet. Plan to DC to [facility] once bed available. Safety measures in place. Needs being met at this time. Problem: PAIN - ADULT  Goal: Verbalizes/displays adequate comfort level or patient's stated pain goal  Description: INTERVENTIONS:  - Encourage pt to monitor pain and request assistance  - Assess pain using appropriate pain scale  - Administer analgesics based on type and severity of pain and evaluate response  - Implement non-pharmacological measures as appropriate and evaluate response  - Consider cultural and social influences on pain and pain management  - Manage/alleviate anxiety  - Utilize distraction and/or relaxation techniques  - Monitor for opioid side effects  - Notify MD/LIP if interventions unsuccessful or patient reports new pain  - Anticipate increased pain with activity and pre-medicate as appropriate  Outcome: Progressing     Problem: RISK FOR INFECTION - ADULT  Goal: Absence of fever/infection during anticipated neutropenic period  Description: INTERVENTIONS  - Monitor WBC  - Administer growth factors as ordered  - Implement neutropenic guidelines  Outcome: Progressing     Problem: SAFETY ADULT - FALL  Goal: Free from fall injury  Description: INTERVENTIONS:  - Assess pt frequently for physical needs  - Identify cognitive and physical deficits and behaviors that affect risk of falls.   - [place] fall precautions as indicated by assessment.  - Educate pt/family on patient safety including physical limitations  - Instruct pt to call for assistance with activity based on assessment  - Modify environment to reduce risk of injury  - Provide assistive devices as appropriate  - Consider OT/PT consult to assist with strengthening/mobility  - Encourage toileting schedule  Outcome: Progressing     Problem: DISCHARGE PLANNING  Goal: Discharge to home or other facility with appropriate resources  Description: INTERVENTIONS:  - Identify barriers to discharge w/pt and caregiver  - Include patient/family/discharge partner in discharge planning  - Arrange for needed discharge resources and transportation as appropriate  - Identify discharge learning needs (meds, wound care, etc)  - Arrange for interpreters to assist at discharge as needed  - Consider post-discharge preferences of patient/family/discharge partner  - Complete POLST form as appropriate  - Assess patient's ability to be responsible for managing their own health  - Refer to Case Management Department for coordinating discharge planning if the patient needs post-hospital services based on physician/LIP order or complex needs related to functional status, cognitive ability or social support system  Outcome: Progressing

## 2022-10-28 NOTE — PLAN OF CARE
Assumed care of patient at 0730  A&Ox4  RA  NSR on Tele  General diet  Left AC saline locked  Reports mild auditory hallucinations  Sitter at arm's length for SI precautions  Seizure precautions in place    Patient to DC to [facility] today  Report given to Ines MATSON    Needs met at this time  Awaiting discharge        Problem: Patient/Family Goals  Goal: Patient/Family Long Term Goal  Description: Patient's Long Term Goal: discharge    Interventions:  - be medically cleared  - transfer back to [facility]  - See additional Care Plan goals for specific interventions  Outcome: Progressing  Goal: Patient/Family Short Term Goal  Description: Patient's Short Term Goal: to feel less anxious     Interventions:   - be compliant w/ medications  - psych consult  - See additional Care Plan goals for specific interventions  Outcome: Progressing     Problem: PAIN - ADULT  Goal: Verbalizes/displays adequate comfort level or patient's stated pain goal  Description: INTERVENTIONS:  - Encourage pt to monitor pain and request assistance  - Assess pain using appropriate pain scale  - Administer analgesics based on type and severity of pain and evaluate response  - Implement non-pharmacological measures as appropriate and evaluate response  - Consider cultural and social influences on pain and pain management  - Manage/alleviate anxiety  - Utilize distraction and/or relaxation techniques  - Monitor for opioid side effects  - Notify MD/LIP if interventions unsuccessful or patient reports new pain  - Anticipate increased pain with activity and pre-medicate as appropriate  Outcome: Progressing     Problem: RISK FOR INFECTION - ADULT  Goal: Absence of fever/infection during anticipated neutropenic period  Description: INTERVENTIONS  - Monitor WBC  - Administer growth factors as ordered  - Implement neutropenic guidelines  Outcome: Progressing     Problem: SAFETY ADULT - FALL  Goal: Free from fall injury  Description: INTERVENTIONS:  - Assess pt frequently for physical needs  - Identify cognitive and physical deficits and behaviors that affect risk of falls.   - [place] fall precautions as indicated by assessment.  - Educate pt/family on patient safety including physical limitations  - Instruct pt to call for assistance with activity based on assessment  - Modify environment to reduce risk of injury  - Provide assistive devices as appropriate  - Consider OT/PT consult to assist with strengthening/mobility  - Encourage toileting schedule  Outcome: Progressing     Problem: DISCHARGE PLANNING  Goal: Discharge to home or other facility with appropriate resources  Description: INTERVENTIONS:  - Identify barriers to discharge w/pt and caregiver  - Include patient/family/discharge partner in discharge planning  - Arrange for needed discharge resources and transportation as appropriate  - Identify discharge learning needs (meds, wound care, etc)  - Arrange for interpreters to assist at discharge as needed  - Consider post-discharge preferences of patient/family/discharge partner  - Complete POLST form as appropriate  - Assess patient's ability to be responsible for managing their own health  - Refer to Case Management Department for coordinating discharge planning if the patient needs post-hospital services based on physician/LIP order or complex needs related to functional status, cognitive ability or social support system  Outcome: Progressing

## 2023-03-28 ENCOUNTER — OFFICE VISIT (OUTPATIENT)
Dept: FAMILY MEDICINE CLINIC | Facility: CLINIC | Age: 28
End: 2023-03-28
Payer: COMMERCIAL

## 2023-03-28 VITALS
HEIGHT: 64 IN | DIASTOLIC BLOOD PRESSURE: 82 MMHG | HEART RATE: 106 BPM | SYSTOLIC BLOOD PRESSURE: 124 MMHG | OXYGEN SATURATION: 99 % | WEIGHT: 262.19 LBS | TEMPERATURE: 97 F | BODY MASS INDEX: 44.76 KG/M2

## 2023-03-28 DIAGNOSIS — Z00.00 GENERAL MEDICAL EXAM: Primary | ICD-10-CM

## 2023-03-28 DIAGNOSIS — E66.01 MORBID OBESITY WITH BMI OF 40.0-44.9, ADULT (HCC): ICD-10-CM

## 2023-03-28 DIAGNOSIS — R56.9 NONEPILEPTIC EPISODE (HCC): ICD-10-CM

## 2023-03-28 DIAGNOSIS — R41.3 MEMORY DEFICIT: ICD-10-CM

## 2023-03-28 DIAGNOSIS — Z11.3 SCREEN FOR STD (SEXUALLY TRANSMITTED DISEASE): ICD-10-CM

## 2023-03-28 DIAGNOSIS — F41.1 GENERALIZED ANXIETY DISORDER: ICD-10-CM

## 2023-03-28 LAB
ALBUMIN SERPL-MCNC: 3.7 G/DL (ref 3.4–5)
ALBUMIN/GLOB SERPL: 0.7 {RATIO} (ref 1–2)
ALP LIVER SERPL-CCNC: 96 U/L
ALT SERPL-CCNC: 46 U/L
ANION GAP SERPL CALC-SCNC: 3 MMOL/L (ref 0–18)
AST SERPL-CCNC: 37 U/L (ref 15–37)
BASOPHILS # BLD AUTO: 0.04 X10(3) UL (ref 0–0.2)
BASOPHILS NFR BLD AUTO: 0.8 %
BILIRUB SERPL-MCNC: 0.3 MG/DL (ref 0.1–2)
BUN BLD-MCNC: 19 MG/DL (ref 7–18)
CALCIUM BLD-MCNC: 9.7 MG/DL (ref 8.5–10.1)
CHLORIDE SERPL-SCNC: 109 MMOL/L (ref 98–112)
CHOLEST SERPL-MCNC: 244 MG/DL (ref ?–200)
CO2 SERPL-SCNC: 25 MMOL/L (ref 21–32)
CREAT BLD-MCNC: 1.14 MG/DL
EOSINOPHIL # BLD AUTO: 0.14 X10(3) UL (ref 0–0.7)
EOSINOPHIL NFR BLD AUTO: 2.7 %
ERYTHROCYTE [DISTWIDTH] IN BLOOD BY AUTOMATED COUNT: 13.2 %
EST. AVERAGE GLUCOSE BLD GHB EST-MCNC: 137 MG/DL (ref 68–126)
FASTING PATIENT LIPID ANSWER: YES
FASTING STATUS PATIENT QL REPORTED: YES
GFR SERPLBLD BASED ON 1.73 SQ M-ARVRAT: 68 ML/MIN/1.73M2 (ref 60–?)
GLOBULIN PLAS-MCNC: 5 G/DL (ref 2.8–4.4)
GLUCOSE BLD-MCNC: 105 MG/DL (ref 70–99)
HBA1C MFR BLD: 6.4 % (ref ?–5.7)
HBV SURFACE AG SER-ACNC: <0.1 [IU]/L
HBV SURFACE AG SERPL QL IA: NONREACTIVE
HCT VFR BLD AUTO: 42.2 %
HDLC SERPL-MCNC: 45 MG/DL (ref 40–59)
HGB BLD-MCNC: 13.3 G/DL
IMM GRANULOCYTES # BLD AUTO: 0.01 X10(3) UL (ref 0–1)
IMM GRANULOCYTES NFR BLD: 0.2 %
LDLC SERPL CALC-MCNC: 167 MG/DL (ref ?–100)
LYMPHOCYTES # BLD AUTO: 1.76 X10(3) UL (ref 1–4)
LYMPHOCYTES NFR BLD AUTO: 34.3 %
MCH RBC QN AUTO: 26.4 PG (ref 26–34)
MCHC RBC AUTO-ENTMCNC: 31.5 G/DL (ref 31–37)
MCV RBC AUTO: 83.7 FL
MONOCYTES # BLD AUTO: 0.59 X10(3) UL (ref 0.1–1)
MONOCYTES NFR BLD AUTO: 11.5 %
NEUTROPHILS # BLD AUTO: 2.59 X10 (3) UL (ref 1.5–7.7)
NEUTROPHILS # BLD AUTO: 2.59 X10(3) UL (ref 1.5–7.7)
NEUTROPHILS NFR BLD AUTO: 50.5 %
NONHDLC SERPL-MCNC: 199 MG/DL (ref ?–130)
OSMOLALITY SERPL CALC.SUM OF ELEC: 287 MOSM/KG (ref 275–295)
PLATELET # BLD AUTO: 357 10(3)UL (ref 150–450)
POTASSIUM SERPL-SCNC: 4.5 MMOL/L (ref 3.5–5.1)
PROT SERPL-MCNC: 8.7 G/DL (ref 6.4–8.2)
RBC # BLD AUTO: 5.04 X10(6)UL
SODIUM SERPL-SCNC: 137 MMOL/L (ref 136–145)
T PALLIDUM AB SER QL IA: NONREACTIVE
T4 FREE SERPL-MCNC: 1.2 NG/DL (ref 0.8–1.7)
TRIGL SERPL-MCNC: 172 MG/DL (ref 30–149)
TSI SER-ACNC: 1.05 MIU/ML (ref 0.36–3.74)
VLDLC SERPL CALC-MCNC: 34 MG/DL (ref 0–30)
WBC # BLD AUTO: 5.1 X10(3) UL (ref 4–11)

## 2023-03-28 PROCEDURE — 3044F HG A1C LEVEL LT 7.0%: CPT | Performed by: NURSE PRACTITIONER

## 2023-03-28 PROCEDURE — 87591 N.GONORRHOEAE DNA AMP PROB: CPT | Performed by: NURSE PRACTITIONER

## 2023-03-28 PROCEDURE — 86780 TREPONEMA PALLIDUM: CPT | Performed by: NURSE PRACTITIONER

## 2023-03-28 PROCEDURE — 84439 ASSAY OF FREE THYROXINE: CPT | Performed by: NURSE PRACTITIONER

## 2023-03-28 PROCEDURE — 3074F SYST BP LT 130 MM HG: CPT | Performed by: NURSE PRACTITIONER

## 2023-03-28 PROCEDURE — 3079F DIAST BP 80-89 MM HG: CPT | Performed by: NURSE PRACTITIONER

## 2023-03-28 PROCEDURE — 87389 HIV-1 AG W/HIV-1&-2 AB AG IA: CPT | Performed by: NURSE PRACTITIONER

## 2023-03-28 PROCEDURE — 87491 CHLMYD TRACH DNA AMP PROBE: CPT | Performed by: NURSE PRACTITIONER

## 2023-03-28 PROCEDURE — 3008F BODY MASS INDEX DOCD: CPT | Performed by: NURSE PRACTITIONER

## 2023-03-28 PROCEDURE — 80050 GENERAL HEALTH PANEL: CPT | Performed by: NURSE PRACTITIONER

## 2023-03-28 PROCEDURE — 99395 PREV VISIT EST AGE 18-39: CPT | Performed by: NURSE PRACTITIONER

## 2023-03-28 PROCEDURE — 80061 LIPID PANEL: CPT | Performed by: NURSE PRACTITIONER

## 2023-03-28 PROCEDURE — 83036 HEMOGLOBIN GLYCOSYLATED A1C: CPT | Performed by: NURSE PRACTITIONER

## 2023-03-28 PROCEDURE — 87340 HEPATITIS B SURFACE AG IA: CPT | Performed by: NURSE PRACTITIONER

## 2023-03-29 ENCOUNTER — TELEPHONE (OUTPATIENT)
Dept: FAMILY MEDICINE CLINIC | Facility: CLINIC | Age: 28
End: 2023-03-29

## 2023-03-29 DIAGNOSIS — E11.9 NEW ONSET TYPE 2 DIABETES MELLITUS (HCC): ICD-10-CM

## 2023-03-29 DIAGNOSIS — E78.00 ELEVATED CHOLESTEROL: ICD-10-CM

## 2023-03-29 DIAGNOSIS — R79.89 ELEVATED SERUM CREATININE: ICD-10-CM

## 2023-03-29 DIAGNOSIS — R73.09 ELEVATED HEMOGLOBIN A1C: Primary | ICD-10-CM

## 2023-03-29 LAB
C TRACH DNA SPEC QL NAA+PROBE: NEGATIVE
N GONORRHOEA DNA SPEC QL NAA+PROBE: NEGATIVE

## 2023-03-29 RX ORDER — ATORVASTATIN CALCIUM 10 MG/1
10 TABLET, FILM COATED ORAL NIGHTLY
Qty: 90 TABLET | Refills: 0 | Status: SHIPPED | OUTPATIENT
Start: 2023-03-29 | End: 2023-06-27

## 2023-03-29 NOTE — TELEPHONE ENCOUNTER
Advised patient that Rx sent to pharmacy and [facility] sent with Neurology referral contact info. Patient verbalized understanding. No further questions at this time.

## 2023-03-29 NOTE — TELEPHONE ENCOUNTER
----- Message from JOHANNA Dangelo sent at 3/29/2023  8:32 AM CDT -----  Please give her information regarding neurologist, was not given after visit summary yesterday  A1C increased, now diabetic. I would recommend starting Metformin 500 mg BID and seeing diabetic educator. Recheck in 3 mo  Chemistries stable except for mild increased in Creatinine. Push fluids. Recheck in 3 mo  Cholesterol elevated. Needs statin.  Recheck 3 mo  HIV, Syphilis, Hepatitis B screening negative  Thyroid normal  No anemia

## 2023-03-29 NOTE — TELEPHONE ENCOUNTER
Chlamydia and Gonorrhea screening negative   Written by JOHANNA Brown on 3/29/2023  1:03 PM CDT    [facility] sent to pt  Notify me if not read by 04/05/23

## 2023-03-29 NOTE — TELEPHONE ENCOUNTER
Advised patient of APRN's note below. Patient verbalized understanding. Pt agreeable to Rx Metformin and statin medication    Future nurse visit appt made  Future Appointments   Date Time Provider Ahsan Mann   7/1/2023  8:30 AM [facility] NURSE JOSE Baxter     No further questions at this time. Future lab orders placed. Please send Rx to [facility].  Thank you

## 2023-04-25 ENCOUNTER — OFFICE VISIT (OUTPATIENT)
Dept: FAMILY MEDICINE CLINIC | Facility: CLINIC | Age: 28
End: 2023-04-25
Payer: COMMERCIAL

## 2023-04-25 VITALS
TEMPERATURE: 97 F | RESPIRATION RATE: 16 BRPM | SYSTOLIC BLOOD PRESSURE: 112 MMHG | HEART RATE: 88 BPM | OXYGEN SATURATION: 99 % | BODY MASS INDEX: 44.22 KG/M2 | HEIGHT: 64 IN | WEIGHT: 259 LBS | DIASTOLIC BLOOD PRESSURE: 84 MMHG

## 2023-04-25 DIAGNOSIS — D25.9 UTERINE LEIOMYOMA, UNSPECIFIED LOCATION: Primary | ICD-10-CM

## 2023-04-25 PROCEDURE — 99213 OFFICE O/P EST LOW 20 MIN: CPT | Performed by: NURSE PRACTITIONER

## 2023-04-25 PROCEDURE — 3008F BODY MASS INDEX DOCD: CPT | Performed by: NURSE PRACTITIONER

## 2023-04-25 PROCEDURE — 3079F DIAST BP 80-89 MM HG: CPT | Performed by: NURSE PRACTITIONER

## 2023-04-25 PROCEDURE — 3074F SYST BP LT 130 MM HG: CPT | Performed by: NURSE PRACTITIONER

## 2023-05-26 RX ORDER — DROSPIRENONE AND ETHINYL ESTRADIOL 0.02-3(28)
1 KIT ORAL DAILY
Qty: 28 TABLET | Refills: 5 | Status: SHIPPED | OUTPATIENT
Start: 2023-05-26 | End: 2024-05-20

## 2023-07-31 ENCOUNTER — TELEPHONE (OUTPATIENT)
Dept: FAMILY MEDICINE CLINIC | Facility: CLINIC | Age: 28
End: 2023-07-31

## 2023-07-31 NOTE — TELEPHONE ENCOUNTER
Lab Frequency Next Occurrence   HEMOGLOBIN A1C Once 06/29/2023   LIPID PANEL Once 58/21/8780   COMP METABOLIC PANEL (14) Once 77/03/5733     Letter mailed to patient for reminder of over due results

## 2023-08-24 ENCOUNTER — INITIAL PRENATAL (OUTPATIENT)
Dept: OBGYN CLINIC | Facility: CLINIC | Age: 28
End: 2023-08-24
Payer: MEDICAID

## 2023-08-24 VITALS
DIASTOLIC BLOOD PRESSURE: 76 MMHG | SYSTOLIC BLOOD PRESSURE: 122 MMHG | HEIGHT: 64 IN | BODY MASS INDEX: 44.87 KG/M2 | WEIGHT: 262.81 LBS | HEART RATE: 83 BPM

## 2023-08-24 DIAGNOSIS — O99.210 OBESITY IN PREGNANCY: ICD-10-CM

## 2023-08-24 DIAGNOSIS — Z34.90 PRENATAL CARE, ANTEPARTUM: Primary | ICD-10-CM

## 2023-08-24 DIAGNOSIS — Z11.3 SCREEN FOR STD (SEXUALLY TRANSMITTED DISEASE): ICD-10-CM

## 2023-08-24 DIAGNOSIS — F25.9 SCHIZO AFFECTIVE SCHIZOPHRENIA (HCC): ICD-10-CM

## 2023-08-24 DIAGNOSIS — O09.891 MEDICATION EXPOSURE DURING FIRST TRIMESTER OF PREGNANCY: ICD-10-CM

## 2023-08-24 DIAGNOSIS — Z36.9 ANTENATAL SCREENING ENCOUNTER: ICD-10-CM

## 2023-08-24 LAB
GLUCOSE (URINE DIPSTICK): NEGATIVE MG/DL
MULTISTIX LOT#: NORMAL NUMERIC
PROTEIN (URINE DIPSTICK): NEGATIVE MG/DL

## 2023-08-24 PROCEDURE — 0500F INITIAL PRENATAL CARE VISIT: CPT | Performed by: NURSE PRACTITIONER

## 2023-08-24 PROCEDURE — 87591 N.GONORRHOEAE DNA AMP PROB: CPT | Performed by: NURSE PRACTITIONER

## 2023-08-24 PROCEDURE — 87491 CHLMYD TRACH DNA AMP PROBE: CPT | Performed by: NURSE PRACTITIONER

## 2023-08-24 PROCEDURE — 3074F SYST BP LT 130 MM HG: CPT | Performed by: NURSE PRACTITIONER

## 2023-08-24 PROCEDURE — 87086 URINE CULTURE/COLONY COUNT: CPT | Performed by: NURSE PRACTITIONER

## 2023-08-24 PROCEDURE — 81002 URINALYSIS NONAUTO W/O SCOPE: CPT | Performed by: NURSE PRACTITIONER

## 2023-08-24 PROCEDURE — 3078F DIAST BP <80 MM HG: CPT | Performed by: NURSE PRACTITIONER

## 2023-08-24 PROCEDURE — 3008F BODY MASS INDEX DOCD: CPT | Performed by: NURSE PRACTITIONER

## 2023-08-24 RX ORDER — METOCLOPRAMIDE 10 MG/1
10 TABLET ORAL
COMMUNITY
Start: 2023-07-23

## 2023-08-25 ENCOUNTER — TELEPHONE (OUTPATIENT)
Dept: OBGYN CLINIC | Facility: CLINIC | Age: 28
End: 2023-08-25

## 2023-08-25 LAB
C TRACH DNA SPEC QL NAA+PROBE: NEGATIVE
N GONORRHOEA DNA SPEC QL NAA+PROBE: NEGATIVE

## 2023-08-25 NOTE — TELEPHONE ENCOUNTER
Patient seen in the office yesterday for her NOB.      Aug 24, 2023: High Risk  Financial Resource Strain  Difficulty of Paying Living Expenses  Very hard  Med Affordability  Yes    Aug 24, 2023: Rebeca Casarez true    Aug 24, 2023: Stress Concern Present  Stress  Feeling of Stress  Yes

## 2023-08-25 NOTE — TELEPHONE ENCOUNTER
Spoke to patient about her SDOH and she would appreciate a [employer] message with food pantry information. MyChart message sent.

## 2023-09-07 ENCOUNTER — LAB ENCOUNTER (OUTPATIENT)
Dept: LAB | Age: 28
End: 2023-09-07
Attending: NURSE PRACTITIONER
Payer: MEDICAID

## 2023-09-07 DIAGNOSIS — O99.210 OBESITY IN PREGNANCY: ICD-10-CM

## 2023-09-07 DIAGNOSIS — Z36.9 ANTENATAL SCREENING ENCOUNTER: ICD-10-CM

## 2023-09-07 LAB
ANTIBODY SCREEN: NEGATIVE
BASOPHILS # BLD AUTO: 0.02 X10(3) UL (ref 0–0.2)
BASOPHILS NFR BLD AUTO: 0.3 %
EOSINOPHIL # BLD AUTO: 0.11 X10(3) UL (ref 0–0.7)
EOSINOPHIL NFR BLD AUTO: 1.5 %
ERYTHROCYTE [DISTWIDTH] IN BLOOD BY AUTOMATED COUNT: 13.7 %
EST. AVERAGE GLUCOSE BLD GHB EST-MCNC: 120 MG/DL (ref 68–126)
HBA1C MFR BLD: 5.8 % (ref ?–5.7)
HBV SURFACE AG SER-ACNC: <0.1 [IU]/L
HBV SURFACE AG SERPL QL IA: NONREACTIVE
HCT VFR BLD AUTO: 37 %
HCV AB SERPL QL IA: NONREACTIVE
HGB BLD-MCNC: 11.6 G/DL
IMM GRANULOCYTES # BLD AUTO: 0.05 X10(3) UL (ref 0–1)
IMM GRANULOCYTES NFR BLD: 0.7 %
LYMPHOCYTES # BLD AUTO: 1.76 X10(3) UL (ref 1–4)
LYMPHOCYTES NFR BLD AUTO: 23.5 %
MCH RBC QN AUTO: 26.6 PG (ref 26–34)
MCHC RBC AUTO-ENTMCNC: 31.4 G/DL (ref 31–37)
MCV RBC AUTO: 84.9 FL
MONOCYTES # BLD AUTO: 0.64 X10(3) UL (ref 0.1–1)
MONOCYTES NFR BLD AUTO: 8.5 %
NEUTROPHILS # BLD AUTO: 4.92 X10 (3) UL (ref 1.5–7.7)
NEUTROPHILS # BLD AUTO: 4.92 X10(3) UL (ref 1.5–7.7)
NEUTROPHILS NFR BLD AUTO: 65.5 %
PLATELET # BLD AUTO: 288 10(3)UL (ref 150–450)
RBC # BLD AUTO: 4.36 X10(6)UL
RH BLOOD TYPE: POSITIVE
RUBV IGG SER QL: POSITIVE
RUBV IGG SER-ACNC: 49.1 IU/ML (ref 10–?)
T PALLIDUM AB SER QL IA: NONREACTIVE
WBC # BLD AUTO: 7.5 X10(3) UL (ref 4–11)

## 2023-09-07 PROCEDURE — 86780 TREPONEMA PALLIDUM: CPT

## 2023-09-07 PROCEDURE — 86901 BLOOD TYPING SEROLOGIC RH(D): CPT

## 2023-09-07 PROCEDURE — 83036 HEMOGLOBIN GLYCOSYLATED A1C: CPT

## 2023-09-07 PROCEDURE — 86762 RUBELLA ANTIBODY: CPT

## 2023-09-07 PROCEDURE — 86803 HEPATITIS C AB TEST: CPT

## 2023-09-07 PROCEDURE — 86900 BLOOD TYPING SEROLOGIC ABO: CPT

## 2023-09-07 PROCEDURE — 87340 HEPATITIS B SURFACE AG IA: CPT

## 2023-09-07 PROCEDURE — 86850 RBC ANTIBODY SCREEN: CPT

## 2023-09-07 PROCEDURE — 36415 COLL VENOUS BLD VENIPUNCTURE: CPT

## 2023-09-07 PROCEDURE — 87389 HIV-1 AG W/HIV-1&-2 AB AG IA: CPT

## 2023-09-07 PROCEDURE — 85025 COMPLETE CBC W/AUTO DIFF WBC: CPT

## 2023-09-08 DIAGNOSIS — R73.03 PRE-DIABETES: Primary | ICD-10-CM

## 2023-09-08 NOTE — PROGRESS NOTES
Contacted patient results and recommendations given. Patient verbalized understanding and willingness to comply. No further questions. Order placed.

## 2023-09-21 ENCOUNTER — ROUTINE PRENATAL (OUTPATIENT)
Dept: OBGYN CLINIC | Facility: CLINIC | Age: 28
End: 2023-09-21
Payer: MEDICAID

## 2023-09-21 VITALS
WEIGHT: 268.81 LBS | DIASTOLIC BLOOD PRESSURE: 76 MMHG | SYSTOLIC BLOOD PRESSURE: 124 MMHG | HEART RATE: 93 BPM | BODY MASS INDEX: 46 KG/M2

## 2023-09-21 DIAGNOSIS — O09.891 MEDICATION EXPOSURE DURING FIRST TRIMESTER OF PREGNANCY: ICD-10-CM

## 2023-09-21 DIAGNOSIS — O99.210 OBESITY IN PREGNANCY: ICD-10-CM

## 2023-09-21 DIAGNOSIS — Z3A.18 18 WEEKS GESTATION OF PREGNANCY: Primary | ICD-10-CM

## 2023-09-21 PROCEDURE — 3074F SYST BP LT 130 MM HG: CPT | Performed by: NURSE PRACTITIONER

## 2023-09-21 PROCEDURE — 0502F SUBSEQUENT PRENATAL CARE: CPT | Performed by: NURSE PRACTITIONER

## 2023-09-21 PROCEDURE — 3078F DIAST BP <80 MM HG: CPT | Performed by: NURSE PRACTITIONER

## 2023-09-21 RX ORDER — CHOLECALCIFEROL (VITAMIN D3) 25 MCG
1 TABLET,CHEWABLE ORAL DAILY
COMMUNITY

## 2023-09-21 NOTE — PROGRESS NOTES
CHUY--18w0d    Doing well. Denies Vb, LOF, contractions. + fetal movement. A/P:   FHT-P  PNL: Declines optional genetic screening.   Reminded to complete early 1 hour GTT  Medication exposure/obesity: schedule appt with MFM for Level II US  RH positive      Return in 4 weeks

## 2023-09-28 ENCOUNTER — TELEPHONE (OUTPATIENT)
Dept: OBGYN CLINIC | Facility: CLINIC | Age: 28
End: 2023-09-28

## 2023-09-28 ENCOUNTER — LAB ENCOUNTER (OUTPATIENT)
Dept: LAB | Age: 28
End: 2023-09-28
Attending: NURSE PRACTITIONER
Payer: MEDICAID

## 2023-09-28 DIAGNOSIS — R73.03 PRE-DIABETES: ICD-10-CM

## 2023-09-28 LAB — GLUCOSE 1H P GLC SERPL-MCNC: 149 MG/DL

## 2023-09-28 PROCEDURE — 36415 COLL VENOUS BLD VENIPUNCTURE: CPT

## 2023-09-28 PROCEDURE — 82950 GLUCOSE TEST: CPT

## 2023-09-29 DIAGNOSIS — R73.09 ELEVATED GLUCOSE: Primary | ICD-10-CM

## 2023-09-29 NOTE — PROGRESS NOTES
Patient notified of elevated 1 hour glucose and need for 3 hour test.  Fasting instructions reviewed. Central Scheduling phone number provided. Patient verbalized understanding.

## 2023-10-02 ENCOUNTER — LABORATORY ENCOUNTER (OUTPATIENT)
Dept: LAB | Age: 28
End: 2023-10-02
Attending: NURSE PRACTITIONER
Payer: MEDICAID

## 2023-10-02 DIAGNOSIS — R73.09 ELEVATED GLUCOSE: ICD-10-CM

## 2023-10-02 LAB
GLUCOSE 1H P GLC SERPL-MCNC: 159 MG/DL
GLUCOSE 2H P GLC SERPL-MCNC: 110 MG/DL
GLUCOSE 3H P GLC SERPL-MCNC: 102 MG/DL (ref 70–140)
GLUCOSE P FAST SERPL-MCNC: 113 MG/DL

## 2023-10-02 PROCEDURE — 82951 GLUCOSE TOLERANCE TEST (GTT): CPT

## 2023-10-02 PROCEDURE — 82952 GTT-ADDED SAMPLES: CPT

## 2023-10-02 PROCEDURE — 36415 COLL VENOUS BLD VENIPUNCTURE: CPT

## 2023-10-04 ENCOUNTER — TELEPHONE (OUTPATIENT)
Dept: OBGYN CLINIC | Facility: CLINIC | Age: 28
End: 2023-10-04

## 2023-10-11 ENCOUNTER — OFFICE VISIT (OUTPATIENT)
Dept: PERINATAL CARE | Facility: HOSPITAL | Age: 28
End: 2023-10-11
Attending: NURSE PRACTITIONER
Payer: MEDICAID

## 2023-10-11 ENCOUNTER — ULTRASOUND ENCOUNTER (OUTPATIENT)
Dept: PERINATAL CARE | Facility: HOSPITAL | Age: 28
End: 2023-10-11
Attending: OBSTETRICS & GYNECOLOGY
Payer: MEDICAID

## 2023-10-11 VITALS
HEIGHT: 64 IN | DIASTOLIC BLOOD PRESSURE: 88 MMHG | BODY MASS INDEX: 44.73 KG/M2 | WEIGHT: 262 LBS | HEART RATE: 100 BPM | SYSTOLIC BLOOD PRESSURE: 120 MMHG

## 2023-10-11 DIAGNOSIS — O99.212 MATERNAL MORBID OBESITY IN SECOND TRIMESTER, ANTEPARTUM (HCC): Primary | ICD-10-CM

## 2023-10-11 DIAGNOSIS — F12.20 CANNABIS USE DISORDER, SEVERE, DEPENDENCE (HCC): ICD-10-CM

## 2023-10-11 DIAGNOSIS — Z03.75 SUSPECTED SHORTENING OF CERVIX NOT FOUND: ICD-10-CM

## 2023-10-11 DIAGNOSIS — D25.9 UTERINE FIBROIDS AFFECTING PREGNANCY IN SECOND TRIMESTER: ICD-10-CM

## 2023-10-11 DIAGNOSIS — F29 PSYCHOSIS (HCC): ICD-10-CM

## 2023-10-11 DIAGNOSIS — F25.9 SCHIZO AFFECTIVE SCHIZOPHRENIA (HCC): ICD-10-CM

## 2023-10-11 DIAGNOSIS — E66.01 MATERNAL MORBID OBESITY IN SECOND TRIMESTER, ANTEPARTUM (HCC): Primary | ICD-10-CM

## 2023-10-11 DIAGNOSIS — O34.12 UTERINE FIBROIDS AFFECTING PREGNANCY IN SECOND TRIMESTER: ICD-10-CM

## 2023-10-11 DIAGNOSIS — E66.01 MORBID OBESITY WITH BMI OF 40.0-44.9, ADULT (HCC): ICD-10-CM

## 2023-10-11 PROCEDURE — 76811 OB US DETAILED SNGL FETUS: CPT | Performed by: OBSTETRICS & GYNECOLOGY

## 2023-10-11 PROCEDURE — 76817 TRANSVAGINAL US OBSTETRIC: CPT

## 2023-10-11 NOTE — TELEPHONE ENCOUNTER
Advised pt of message below. Pt aware she should speak with provider at next visits. Forms have been placed on hold and will call us once pt speaks with provider. Forms placed on LMTCB/PTS PN HOLD.

## 2023-10-11 NOTE — TELEPHONE ENCOUNTER
[place],     Pt is looking for an accommodation due to being pregnant. PT states she needs a chair when she is working so that she is able to sit, she is asking for additional breaks, and asking if she is able to have food on her during work as she needs to eat snacks due to being pregnant.  Do you support the request?      Thank you,  [facility]

## 2023-10-19 ENCOUNTER — ROUTINE PRENATAL (OUTPATIENT)
Dept: OBGYN CLINIC | Facility: CLINIC | Age: 28
End: 2023-10-19
Payer: MEDICAID

## 2023-10-19 VITALS
HEIGHT: 64 IN | SYSTOLIC BLOOD PRESSURE: 106 MMHG | HEART RATE: 90 BPM | BODY MASS INDEX: 44.79 KG/M2 | DIASTOLIC BLOOD PRESSURE: 66 MMHG | WEIGHT: 262.38 LBS

## 2023-10-19 DIAGNOSIS — Z3A.22 22 WEEKS GESTATION OF PREGNANCY: Primary | ICD-10-CM

## 2023-10-19 PROBLEM — R41.82 ALTERED MENTAL STATUS: Status: RESOLVED | Noted: 2022-10-25 | Resolved: 2023-10-19

## 2023-10-19 PROBLEM — R41.82 ALTERED MENTAL STATUS, UNSPECIFIED ALTERED MENTAL STATUS TYPE: Status: RESOLVED | Noted: 2022-10-25 | Resolved: 2023-10-19

## 2023-10-19 PROCEDURE — 3008F BODY MASS INDEX DOCD: CPT | Performed by: STUDENT IN AN ORGANIZED HEALTH CARE EDUCATION/TRAINING PROGRAM

## 2023-10-19 PROCEDURE — 99213 OFFICE O/P EST LOW 20 MIN: CPT | Performed by: STUDENT IN AN ORGANIZED HEALTH CARE EDUCATION/TRAINING PROGRAM

## 2023-10-19 PROCEDURE — 3074F SYST BP LT 130 MM HG: CPT | Performed by: STUDENT IN AN ORGANIZED HEALTH CARE EDUCATION/TRAINING PROGRAM

## 2023-10-19 PROCEDURE — 3078F DIAST BP <80 MM HG: CPT | Performed by: STUDENT IN AN ORGANIZED HEALTH CARE EDUCATION/TRAINING PROGRAM

## 2023-10-19 NOTE — PROGRESS NOTES
Return OB Visit 20-27 WGA      GA: 22w0d   10/19/23  1545   BP: 106/66   Pulse: 90   Weight: 262 lb 6.4 oz (119 kg)   Height: 64\"       Doing well. Denies LOF/VB/uctx. +FM. RH positive  S/P Anatomy scan with MFM  1 hr glucose and CBC (24-28wks) ordered     Patient Active Problem List    Uterine fibroids affecting pregnancy in second trimester      Obesity in pregnancy            If she wants to continue metformin for entire            pregnancy, then repeat 3 hr gtt while using            metformin at 24-28 weeks. If she wants to            discontinue metformin, then discontinue it 1 week            prior to 3 hr gtt between 24 & 28             weeks      Medication exposure during first trimester of pregnancy      Schizo affective schizophrenia ([address]            Continue zuprasidone and co-management with her            mental health team                  MDD (major depressive disorder), recurrent, severe, with psychosis ([address]      Suicidal behavior with attempted self-injury ([address]      Anxiety disorder, unspecified      Psychosis ([address]      Cannabis use disorder, severe, dependence ([address]      Morbid obesity with BMI of 40.0-44.9, adult ([address]            [X] MFM US            Monthly growth ultrasounds starting at 28 weeks,            add BPP to each growth             ultrasound at 32 weeks and beyond            Weekly NSTs at 34 weeks            Limit weight gain to 11-20 pounds. Delivery at 39-40 weeks                        RTC q4wks        Note to patient and family   The [facility] makes medical notes available to patients in the interest of transparency. However, please be advised that this is a medical document. It is intended as arnm-yl-vulb communication. It is written and medical language may contain abbreviations or verbiage that are technical and unfamiliar. It may appear blunt or direct.   Medical documents are intended to carry relevant information, facts as evident, and the clinical opinion of the practitioner.     Paul Ruiz MD

## 2023-10-25 NOTE — TELEPHONE ENCOUNTER
Dr. Elva Serra,     *The ACKNOWLEDGE button has been moved to the top right ribbon*    Please sign off on form if you agree to: CECILIA for pt to have a chair, food/snacks and additional breaks  (place your signature on the first page only)    -From your Inbasket, Highlight the patient and click Chart   -Double click the 1/00/59 Forms Completion telephone encounter  -Scroll down to the Media section   -Click the blue Hyperlink: Cecilia Serra 77/56/74  -Click Acknowledge located in the top right ribbon/menu   -Drag the mouse into the blank space of the document and a + sign will appear. Left click to   electronically sign the document. Thank you,    Alicia Barber.

## 2023-10-26 NOTE — TELEPHONE ENCOUNTER
Dr. Quinn Li,     *The ACKNOWLEDGE button has been moved to the top right ribbon*    Please sign off on form if you agree to: ADA for pt to have a chair, food/snacks and additional breaks   (place your signature on the first page only)    -From your Inbasket, Highlight the patient and click Chart   -Double click the 4/40/94 Forms Completion telephone encounter  -Scroll down to the Media section   -Click the blue Hyperlink: Kendrick Li 41/33/59  -Click Acknowledge located in the top right ribbon/menu   -Drag the mouse into the blank space of the document and a + sign will appear. Left click to   electronically sign the document. Thank you,    Roseann Peguero.

## 2023-10-30 NOTE — TELEPHONE ENCOUNTER
Pt called asking for status. Informed pt ada have been completed and faxed to [facility] [phone].

## 2023-11-14 ENCOUNTER — ROUTINE PRENATAL (OUTPATIENT)
Dept: OBGYN CLINIC | Facility: CLINIC | Age: 28
End: 2023-11-14
Payer: MEDICAID

## 2023-11-14 VITALS
WEIGHT: 268.5 LBS | SYSTOLIC BLOOD PRESSURE: 120 MMHG | DIASTOLIC BLOOD PRESSURE: 80 MMHG | HEART RATE: 107 BPM | BODY MASS INDEX: 46 KG/M2

## 2023-11-14 DIAGNOSIS — Z3A.25 25 WEEKS GESTATION OF PREGNANCY: Primary | ICD-10-CM

## 2023-11-14 PROCEDURE — 3079F DIAST BP 80-89 MM HG: CPT | Performed by: STUDENT IN AN ORGANIZED HEALTH CARE EDUCATION/TRAINING PROGRAM

## 2023-11-14 PROCEDURE — 3074F SYST BP LT 130 MM HG: CPT | Performed by: STUDENT IN AN ORGANIZED HEALTH CARE EDUCATION/TRAINING PROGRAM

## 2023-11-14 PROCEDURE — 99212 OFFICE O/P EST SF 10 MIN: CPT | Performed by: STUDENT IN AN ORGANIZED HEALTH CARE EDUCATION/TRAINING PROGRAM

## 2023-11-14 NOTE — PROGRESS NOTES
Return OB Visit 20-27 WGA      GA: 25w5d  Vitals:    23 1003   BP: 120/80   Pulse: 107   Weight: 268 lb 8 oz (121.8 kg)       Doing well. Denies LOF/VB/uctx. +FM. RH positive  S/P Anatomy scan with MFM  3 hr glucose and CBC (24-28wks) ordered, but not completed yet     Patient Active Problem List    Diagnosis    Uterine fibroids affecting pregnancy in second trimester    Obesity in pregnancy     If she wants to continue metformin for entire pregnancy, then repeat 3 hr gtt while using metformin at 24-28 weeks. If she wants to discontinue metformin, then discontinue it 1 week prior to 3 hr gtt between 24 & 28 weeks      Medication exposure during first trimester of pregnancy    Schizo affective schizophrenia ([address]     Continue zuprasidone and co-management with her mental health team        MDD (major depressive disorder), recurrent, severe, with psychosis ([address]    Suicidal behavior with attempted self-injury ([address]    Anxiety disorder, unspecified    Psychosis ([address]    Cannabis use disorder, severe, dependence ([address]    Morbid obesity with BMI of 40.0-44.9, adult ([address]     [X] MFM US  Monthly growth ultrasounds starting at 28 weeks, add BPP to each growth ultrasound at 32 weeks and beyond  Weekly NSTs at 34 weeks  Limit weight gain to 11-20 pounds. Delivery at 39-40 weeks               RTC q4wks        Note to patient and family   The [facility] makes medical notes available to patients in the interest of transparency. However, please be advised that this is a medical document. It is intended as dexu-dg-nwrr communication. It is written and medical language may contain abbreviations or verbiage that are technical and unfamiliar. It may appear blunt or direct. Medical documents are intended to carry relevant information, facts as evident, and the clinical opinion of the practitioner.     Nan De Leon MD

## 2023-11-27 ENCOUNTER — LABORATORY ENCOUNTER (OUTPATIENT)
Dept: LAB | Age: 28
End: 2023-11-27
Attending: STUDENT IN AN ORGANIZED HEALTH CARE EDUCATION/TRAINING PROGRAM
Payer: MEDICAID

## 2023-11-27 DIAGNOSIS — Z3A.22 22 WEEKS GESTATION OF PREGNANCY: ICD-10-CM

## 2023-11-27 LAB
GLUCOSE 1H P GLC SERPL-MCNC: 161 MG/DL
GLUCOSE 2H P GLC SERPL-MCNC: 123 MG/DL
GLUCOSE 3H P GLC SERPL-MCNC: 108 MG/DL (ref 70–140)
GLUCOSE P FAST SERPL-MCNC: 91 MG/DL

## 2023-11-27 PROCEDURE — 82952 GTT-ADDED SAMPLES: CPT

## 2023-11-27 PROCEDURE — 82951 GLUCOSE TOLERANCE TEST (GTT): CPT

## 2023-11-27 PROCEDURE — 36415 COLL VENOUS BLD VENIPUNCTURE: CPT

## 2023-12-06 ENCOUNTER — OFFICE VISIT (OUTPATIENT)
Dept: PERINATAL CARE | Facility: HOSPITAL | Age: 28
End: 2023-12-06
Attending: OBSTETRICS & GYNECOLOGY
Payer: MEDICAID

## 2023-12-06 ENCOUNTER — TELEPHONE (OUTPATIENT)
Dept: OBGYN CLINIC | Facility: CLINIC | Age: 28
End: 2023-12-06

## 2023-12-06 VITALS
DIASTOLIC BLOOD PRESSURE: 72 MMHG | HEIGHT: 64 IN | SYSTOLIC BLOOD PRESSURE: 110 MMHG | WEIGHT: 272 LBS | BODY MASS INDEX: 46.44 KG/M2 | HEART RATE: 96 BPM

## 2023-12-06 DIAGNOSIS — F25.9 SCHIZO AFFECTIVE SCHIZOPHRENIA (HCC): ICD-10-CM

## 2023-12-06 DIAGNOSIS — O34.13 LEIOMYOMA OF UTERUS AFFECTING PREGNANCY IN THIRD TRIMESTER: ICD-10-CM

## 2023-12-06 DIAGNOSIS — D25.9 LEIOMYOMA OF UTERUS AFFECTING PREGNANCY IN THIRD TRIMESTER: ICD-10-CM

## 2023-12-06 DIAGNOSIS — Z03.75 SUSPECTED SHORTENING OF CERVIX NOT FOUND: ICD-10-CM

## 2023-12-06 DIAGNOSIS — O99.213 MATERNAL MORBID OBESITY IN THIRD TRIMESTER, ANTEPARTUM (HCC): Primary | ICD-10-CM

## 2023-12-06 DIAGNOSIS — F29 PSYCHOSIS (HCC): ICD-10-CM

## 2023-12-06 DIAGNOSIS — F12.20 CANNABIS USE DISORDER, SEVERE, DEPENDENCE (HCC): ICD-10-CM

## 2023-12-06 DIAGNOSIS — E66.01 MORBID OBESITY WITH BMI OF 40.0-44.9, ADULT (HCC): ICD-10-CM

## 2023-12-06 DIAGNOSIS — E66.01 MATERNAL MORBID OBESITY IN THIRD TRIMESTER, ANTEPARTUM (HCC): Primary | ICD-10-CM

## 2023-12-06 PROCEDURE — 76816 OB US FOLLOW-UP PER FETUS: CPT | Performed by: OBSTETRICS & GYNECOLOGY

## 2023-12-13 ENCOUNTER — LAB ENCOUNTER (OUTPATIENT)
Dept: LAB | Age: 28
End: 2023-12-13
Attending: OBSTETRICS & GYNECOLOGY
Payer: MEDICAID

## 2023-12-13 ENCOUNTER — ROUTINE PRENATAL (OUTPATIENT)
Dept: OBGYN CLINIC | Facility: CLINIC | Age: 28
End: 2023-12-13
Payer: MEDICAID

## 2023-12-13 VITALS
SYSTOLIC BLOOD PRESSURE: 122 MMHG | DIASTOLIC BLOOD PRESSURE: 70 MMHG | WEIGHT: 277 LBS | HEIGHT: 64 IN | HEART RATE: 78 BPM | BODY MASS INDEX: 47.29 KG/M2

## 2023-12-13 DIAGNOSIS — Z34.90 ENCOUNTER FOR SUPERVISION OF NORMAL PREGNANCY, ANTEPARTUM, UNSPECIFIED GRAVIDITY: ICD-10-CM

## 2023-12-13 DIAGNOSIS — F25.9 SCHIZO AFFECTIVE SCHIZOPHRENIA (HCC): ICD-10-CM

## 2023-12-13 DIAGNOSIS — Z3A.22 22 WEEKS GESTATION OF PREGNANCY: ICD-10-CM

## 2023-12-13 DIAGNOSIS — O09.891 MEDICATION EXPOSURE DURING FIRST TRIMESTER OF PREGNANCY: ICD-10-CM

## 2023-12-13 DIAGNOSIS — E66.01 MORBID OBESITY WITH BMI OF 40.0-44.9, ADULT (HCC): Primary | ICD-10-CM

## 2023-12-13 DIAGNOSIS — D25.9 LEIOMYOMA OF UTERUS AFFECTING PREGNANCY, ANTEPARTUM: ICD-10-CM

## 2023-12-13 DIAGNOSIS — O34.10 LEIOMYOMA OF UTERUS AFFECTING PREGNANCY, ANTEPARTUM: ICD-10-CM

## 2023-12-13 LAB
BASOPHILS # BLD AUTO: 0.02 X10(3) UL (ref 0–0.2)
BASOPHILS NFR BLD AUTO: 0.2 %
EOSINOPHIL # BLD AUTO: 0.15 X10(3) UL (ref 0–0.7)
EOSINOPHIL NFR BLD AUTO: 1.8 %
ERYTHROCYTE [DISTWIDTH] IN BLOOD BY AUTOMATED COUNT: 13.7 %
HCT VFR BLD AUTO: 32.3 %
HGB BLD-MCNC: 10.4 G/DL
IMM GRANULOCYTES # BLD AUTO: 0.22 X10(3) UL (ref 0–1)
IMM GRANULOCYTES NFR BLD: 2.7 %
LYMPHOCYTES # BLD AUTO: 1.47 X10(3) UL (ref 1–4)
LYMPHOCYTES NFR BLD AUTO: 17.7 %
MCH RBC QN AUTO: 27.1 PG (ref 26–34)
MCHC RBC AUTO-ENTMCNC: 32.2 G/DL (ref 31–37)
MCV RBC AUTO: 84.1 FL
MONOCYTES # BLD AUTO: 0.65 X10(3) UL (ref 0.1–1)
MONOCYTES NFR BLD AUTO: 7.8 %
NEUTROPHILS # BLD AUTO: 5.78 X10 (3) UL (ref 1.5–7.7)
NEUTROPHILS # BLD AUTO: 5.78 X10(3) UL (ref 1.5–7.7)
NEUTROPHILS NFR BLD AUTO: 69.8 %
PLATELET # BLD AUTO: 288 10(3)UL (ref 150–450)
RBC # BLD AUTO: 3.84 X10(6)UL
T PALLIDUM AB SER QL IA: NONREACTIVE
WBC # BLD AUTO: 8.3 X10(3) UL (ref 4–11)

## 2023-12-13 PROCEDURE — 3078F DIAST BP <80 MM HG: CPT | Performed by: OBSTETRICS & GYNECOLOGY

## 2023-12-13 PROCEDURE — 87389 HIV-1 AG W/HIV-1&-2 AB AG IA: CPT

## 2023-12-13 PROCEDURE — 99213 OFFICE O/P EST LOW 20 MIN: CPT | Performed by: OBSTETRICS & GYNECOLOGY

## 2023-12-13 PROCEDURE — 36415 COLL VENOUS BLD VENIPUNCTURE: CPT

## 2023-12-13 PROCEDURE — 3008F BODY MASS INDEX DOCD: CPT | Performed by: OBSTETRICS & GYNECOLOGY

## 2023-12-13 PROCEDURE — 3074F SYST BP LT 130 MM HG: CPT | Performed by: OBSTETRICS & GYNECOLOGY

## 2023-12-13 PROCEDURE — 86780 TREPONEMA PALLIDUM: CPT

## 2023-12-13 PROCEDURE — 85025 COMPLETE CBC W/AUTO DIFF WBC: CPT

## 2023-12-13 NOTE — PROGRESS NOTES
CHUY    GA: 29w6d  Vitals:    23 0935   BP: 122/70   Pulse: 78   Weight: 277 lb (125.6 kg)   Height: 64\"       Doing well, +FM  Denies LOF/VB/uctx  Rh +, TDAP offer at next visit, EPDS 0  PTL and Fetal movement instructions given  Repeat RPR and HIV ordered and d/w patient   D/w patient FM expectations in pregnancy   Letter provided for dental work     Contraception counseling  - d/w patient options for contraception including OCP, Nuvaring, Depo Provera and LARC (Nexplanon versus IUD)   - risks, benefits and alternatives discussed   - information provided   - may consider Depo Provera       Problem List Items Addressed This Visit          Endocrine and Metabolic    Morbid obesity with BMI of 40.0-44.9, adult ([facility] [address] - Primary       Mental Health    Schizo affective schizophrenia ([facility] [address]       Gravid and     Medication exposure during first trimester of pregnancy    Leiomyoma of uterus affecting pregnancy, antepartum     Other Visit Diagnoses       Encounter for supervision of normal pregnancy, antepartum, unspecified         Relevant Orders    HIV Ag/Ab Combo    T Pallidum Screening Cooks            RTC in 2 wks      Note to patient and family   The 21st Century Cures Act makes medical notes available to patients in the interest of transparency. However, please be advised that this is a medical document. It is intended as ebzj-tt-qkko communication. It is written and medical language may contain abbreviations or verbiage that are technical and unfamiliar. It may appear blunt or direct. Medical documents are intended to carry relevant information, facts as evident, and the clinical opinion of the practitioner.

## 2023-12-19 DIAGNOSIS — E11.9 TYPE 2 DIABETES MELLITUS WITHOUT COMPLICATION, WITHOUT LONG-TERM CURRENT USE OF INSULIN (HCC): Primary | ICD-10-CM

## 2023-12-19 NOTE — TELEPHONE ENCOUNTER
Diabetic Medication Protocol [id number]/19/2023 10:47 AM   Protocol Details Microalbumin procedure in past 12 months or taking ACE/ARB    Appointment in past 6 or next 3 months    HgBA1C procedure resulted in past 6 months    Last HgBA1C < 7.5      Routing to provider per protocol. metFORMIN 500 MG Oral Tab   Last refilled on  for #  with  rf. EXTERNAL  Last labs 12/13/23. Last seen on 4/25/23. Future Appointments   Date Time Provider Ahsan Mackenzie   12/28/2023  2:45 PM EM[facility] OB NURSE [place] [facility] [place]   12/28/2023  3:15 PM Neelima Suero MD [facility] [place]   1/3/2024  3:30 PM [id number] [address] [facility]   1/11/2024 10:00 AM EMG OB NURSE [place] [facility] [place]   1/11/2024 10:30 AM Danae Veras MD [facility] [place]   1/22/2024 10:30 AM EMG OB NURSE PLFD [facility] P [facility] [address]   1/22/2024 11:00 AM NILES Gresham [facility] OB/[facility]N P [facility] [address]   1/31/2024  9:00 AM [id number] [address] [facility]   2/1/2024 11:30 AM EMG OB NURSE [place] [facility] [place]   2/1/2024 12:15 PM Carlyle Correa MD [facility] [place]   2/5/2024 10:00 AM EMG OB NURSE [place] [facility] [place]   2/5/2024 10:30 AM Carlyle Correa MD [facility] [place]   2/12/2024 10:30 AM EMG OB NURSE [place] [facility] [place]   2/12/2024 11:15 AM NILES [place] [facility] [place]   2/20/2024 10:30 AM EMG OB NURSE [place] [facility] [place]   2/20/2024 11:00 AM Danae Veras MD [facility] [place]          Thank you.

## 2023-12-28 ENCOUNTER — APPOINTMENT (OUTPATIENT)
Dept: OBGYN CLINIC | Facility: CLINIC | Age: 28
End: 2023-12-28
Payer: MEDICAID

## 2023-12-28 ENCOUNTER — ROUTINE PRENATAL (OUTPATIENT)
Dept: OBGYN CLINIC | Facility: CLINIC | Age: 28
End: 2023-12-28
Payer: MEDICAID

## 2023-12-28 VITALS
HEART RATE: 92 BPM | SYSTOLIC BLOOD PRESSURE: 116 MMHG | DIASTOLIC BLOOD PRESSURE: 70 MMHG | WEIGHT: 276.38 LBS | BODY MASS INDEX: 47 KG/M2

## 2023-12-28 DIAGNOSIS — E66.01 MORBID OBESITY WITH BMI OF 40.0-44.9, ADULT (HCC): ICD-10-CM

## 2023-12-28 DIAGNOSIS — Z23 NEED FOR VACCINATION: ICD-10-CM

## 2023-12-28 DIAGNOSIS — O09.93 SUPERVISION OF HIGH RISK PREGNANCY IN THIRD TRIMESTER: Primary | ICD-10-CM

## 2023-12-28 PROCEDURE — 3078F DIAST BP <80 MM HG: CPT | Performed by: OBSTETRICS & GYNECOLOGY

## 2023-12-28 PROCEDURE — 90471 IMMUNIZATION ADMIN: CPT | Performed by: OBSTETRICS & GYNECOLOGY

## 2023-12-28 PROCEDURE — 3074F SYST BP LT 130 MM HG: CPT | Performed by: OBSTETRICS & GYNECOLOGY

## 2023-12-28 PROCEDURE — 59025 FETAL NON-STRESS TEST: CPT | Performed by: OBSTETRICS & GYNECOLOGY

## 2023-12-28 PROCEDURE — 99213 OFFICE O/P EST LOW 20 MIN: CPT | Performed by: OBSTETRICS & GYNECOLOGY

## 2023-12-28 PROCEDURE — 90715 TDAP VACCINE 7 YRS/> IM: CPT | Performed by: OBSTETRICS & GYNECOLOGY

## 2023-12-28 NOTE — PROGRESS NOTES
32w0d seen for routine OB visit. Denies ctx, lof, vb. Reports good FM. Patient Active Problem List   Diagnosis    Morbid obesity with BMI of 40.0-44.9, adult ([facility] [address]    Psychosis ([facility] [address]    Cannabis use disorder, severe, dependence ([facility] [address]    Anxiety disorder, unspecified    Suicidal behavior with attempted self-injury ([facility] [address]    MDD (major depressive disorder), recurrent, severe, with psychosis ([facility] [address]    Obesity in pregnancy    Medication exposure during first trimester of pregnancy    Schizo affective schizophrenia ([facility] [address]    Leiomyoma of uterus affecting pregnancy, antepartum        TW lb 6 oz (10.1 kg)   Depression Scale Total: 0 (2023 10:00 AM)      Gen: AAOx3, NAD  Resp: breathing comfortably  Abdomen: gravid, soft, nontender  Ext: nontender, no edema    NST - baseline 130, moderate variability, +15x15 accels, no decels  [place] - quiet     Plan:  - reactive NST, continue weekly at 34wks  - MFM f/u 1/3  - 3T HIV neg  - tdap today, counseled regarding RSV vaccine  - return precautions reviewed    RTO in 2 week(s).

## 2024-01-03 ENCOUNTER — OFFICE VISIT (OUTPATIENT)
Dept: PERINATAL CARE | Facility: HOSPITAL | Age: 29
End: 2024-01-03
Attending: OBSTETRICS & GYNECOLOGY
Payer: COMMERCIAL

## 2024-01-03 VITALS
SYSTOLIC BLOOD PRESSURE: 120 MMHG | WEIGHT: 276 LBS | HEIGHT: 64 IN | DIASTOLIC BLOOD PRESSURE: 85 MMHG | BODY MASS INDEX: 47.12 KG/M2 | HEART RATE: 99 BPM

## 2024-01-03 DIAGNOSIS — O34.13 LEIOMYOMA OF UTERUS AFFECTING PREGNANCY IN THIRD TRIMESTER: ICD-10-CM

## 2024-01-03 DIAGNOSIS — O09.891 MEDICATION EXPOSURE DURING FIRST TRIMESTER OF PREGNANCY: ICD-10-CM

## 2024-01-03 DIAGNOSIS — E66.01 MATERNAL MORBID OBESITY IN THIRD TRIMESTER, ANTEPARTUM (HCC): Primary | ICD-10-CM

## 2024-01-03 DIAGNOSIS — O99.213 MATERNAL MORBID OBESITY IN THIRD TRIMESTER, ANTEPARTUM (HCC): Primary | ICD-10-CM

## 2024-01-03 DIAGNOSIS — D25.9 LEIOMYOMA OF UTERUS AFFECTING PREGNANCY IN THIRD TRIMESTER: ICD-10-CM

## 2024-01-03 DIAGNOSIS — O99.213 MATERNAL MORBID OBESITY IN THIRD TRIMESTER, ANTEPARTUM (HCC): ICD-10-CM

## 2024-01-03 DIAGNOSIS — E66.01 MORBID OBESITY WITH BMI OF 40.0-44.9, ADULT (HCC): ICD-10-CM

## 2024-01-03 DIAGNOSIS — Z03.75 SUSPECTED SHORTENING OF CERVIX NOT FOUND: ICD-10-CM

## 2024-01-03 DIAGNOSIS — E66.01 MATERNAL MORBID OBESITY IN THIRD TRIMESTER, ANTEPARTUM (HCC): ICD-10-CM

## 2024-01-03 PROCEDURE — 76819 FETAL BIOPHYS PROFIL W/O NST: CPT

## 2024-01-03 PROCEDURE — 76816 OB US FOLLOW-UP PER FETUS: CPT | Performed by: OBSTETRICS & GYNECOLOGY

## 2024-01-03 NOTE — PROGRESS NOTES
Outpatient Maternal-Fetal Medicine Consultation    Dear Dr. Angeles    Thank you for requesting ultrasound evaluation and maternal fetal medicine consultation on your patient Deshawn Valera.  As you are aware she is a 28 year old female  with a rajput pregnancy and an Estimated Date of Delivery: 24.  A maternal-fetal medicine f/u is today.   Her prenatal records and labs were reviewed.      She has not spoken with her OB yet about contraception.  She does think she wants to use OCPs as they have worked well for her in the past.  Other contraceptives she has tried caused increased bleeding.  Feels well on her ziprasidone.  It was decreased for the dose.  ROS    HISTORY  OB History    Para Term  AB Living   1             SAB IAB Ectopic Multiple Live Births                  # Outcome Date GA Lbr Rudy/2nd Weight Sex Delivery Anes PTL Lv   1 Current                Allergies:  Allergies   Allergen Reactions    Other HIVES     Seaweed     Pollen OTHER (SEE COMMENTS)     \"stuffy nose\"      Current Meds:  Current Outpatient Medications   Medication Sig Dispense Refill    METFORMIN 500 MG Oral Tab TAKE 1 TABLET(500 MG) BY MOUTH TWICE DAILY WITH MEALS 180 tablet 0    ziprasidone 40 MG Oral Cap Take 1 capsule (40 mg total) by mouth 2 (two) times daily with meals. (Patient taking differently: Take 1 capsule (40 mg total) by mouth 2 (two) times daily with meals.  decrease to 20mg qam and 40mg qdinner  2024 decreased to 20 mg twice daily) 60 capsule 2    prenatal vitamin with DHA 27-0.8-228 MG Oral Cap Take 1 capsule by mouth daily.          HISTORY:  Past Medical History:   Diagnosis Date    Anxiety     Depression     Hyperlipidemia     Nonepileptic episode (HCC)     Obesity in pregnancy 2023    Schizo affective schizophrenia (HCC)     Shaking       No past surgical history on file.   Family History   Problem Relation Age of Onset    Schizophrenia Father     Schizophrenia Mother      Schizophrenia Paternal Grandmother       Social History     Socioeconomic History    Marital status: Single   Tobacco Use    Smoking status: Never    Smokeless tobacco: Never   Vaping Use    Vaping Use: Never used   Substance and Sexual Activity    Alcohol use: Not Currently     Comment: occasional    Drug use: Not Currently     Types: Cannabis     Comment: occasional     Social Determinants of Health     Financial Resource Strain: High Risk (8/24/2023)    Financial Resource Strain     Difficulty of Paying Living Expenses: Very hard     Med Affordability: Yes   Food Insecurity: Food Insecurity Present (8/24/2023)    Food Insecurity     Food Insecurity: Often true   Transportation Needs: No Transportation Needs (8/24/2023)    Transportation Needs     Lack of Transportation: No   Stress: Stress Concern Present (8/24/2023)    Stress     Feeling of Stress : Yes   Housing Stability: Low Risk  (8/24/2023)    Housing Stability     Housing Instability: No          PHYSICAL EXAMINATION:  /85 (BP Location: Right arm, Patient Position: Sitting, Cuff Size: large)   Pulse 99   Ht 5' 4\" (1.626 m)   Wt 276 lb (125.2 kg)   LMP  (LMP Unknown)   BMI 47.38 kg/m²       Physical Exam  Constitutional:       Appearance: Normal appearance. She is obese. She is not ill-appearing.   Pulmonary:      Effort: Pulmonary effort is normal.   Abdominal:      Palpations: Abdomen is soft.      Tenderness: There is no abdominal tenderness.   Neurological:      Mental Status: She is alert and oriented to person, place, and time.   Psychiatric:         Mood and Affect: Mood normal.         Behavior: Behavior normal.       DISCUSSION  During her visit we discussed and reviewed the following issues:  OBESITY:  Her BMI prior to pregnancy was 43   Please see previous [facility] detailed discussion.     Prevention of Preeclampsia   Please see previous [facility] detailed discussion.   PSYCHIATRIC DISORDERS  DISORDERS IN PREGNANCY     She has a history of schizo  affective schizophrenia disorder.  Last suicide attempt and psychosis was approximately a year ago.  Since that time she has been on ziprasidone.  She has been doing better.  She is with her mom today and lives with her mom Opal.  Her mom notes that she previously has used cannabis by smoking.  She is stopped that since she is pregnant.  Her mom also notices that there are less issues with hearing voices when she is not using the cannabis.   Please see previous [facility] detailed discussion.     Ziprasidone -    It is primarily used for the treatment of schizophrenia; treatment of acute manic or mixed episodes associated with bipolar disorder with or without psychosis; maintenance treatment of bipolar disorder as an adjunct to lithium or valproate; acute agitation in patients with schizophrenia   Off- Label uses:  Delusional parasitosis; Major depressive disorder; Psychosis/agitation associated with dementia Please see previous [facility] detailed discussion.   Healthcare providers are encouraged to enroll women 18-45 years of age exposed to ziprasidone during pregnancy in the Atypical Antipsychotics Pregnancy Registry ([phone] or [email or web address]).     Breast-Feeding Considerations   It is not known if ziprasidone is excreted into breast milk. Breast-feeding is not recommended by the .         FIBROIDS:  I informed her of the fibroids.  Fibroids can increase in size during pregnancy and could lead to abdominal pain with an increased risk of  labor.  Most cases are unremarkable.  Fibroids can obstruct the pelvis and increase the risk for a . We discussed the morbidity and mortality associated with prematurity at various gestational ages.  The signs and symptoms of  labor were discussed.        METFORMIN     She is on metformin for insulin resistance.  Please see previous [facility] detailed discussion.       She wants to continue the metformin, I recommended  that she do the 3-hour glucose test while taking it.  If she would like to discontinue the metformin, then the 3-hour glucose test at 24 to 28 weeks should be done after she has been off the metformin for at least a week.    Lab Results   Component Value Date    GLUFG 91 2023    GLU1G 161 2023    GLU2G 123 2023    GLU3G 108 2023          OB ULTRASOUND REPORT   See imaging tab for complete ultrasound report or in PACS    Single IUP in cephalic presentation.    Placenta is posterior.   A 3 vessel cord is noted.  Cardiac activity is present at 151 bpm  EFW 1831 g ( 4 lb 1 oz); 28%.    EDELMIRA is  15.2 cm.  MVP is 4.4 cm      BIOPHYSICAL PROFILE:  Movement:    2/2  Tone:            2/2  Breathin/2  Fluid:             2/2  TOTAL:             IMPRESSION:  IUP at 32w6d   Schizoaffective schizophrenia disorder  Class III Obesity  Uterine fibroid      RECOMMENDATIONS:  Continue care with Dr. Angeles  Monthly growth ultrasounds, add BPP to each growth ultrasound at 32 weeks and beyond  Weekly NSTs at 34 weeks  Limit weight gain to 11-20 pounds.  Delivery at 39-40 weeks   Continue zuprasidone and co-management with her mental health team        Thank you for allowing me to participate in the care of your patient.  Please do not hesitate to contact me if additional questions or concerns arise.      Jose Miguel Phelan D.O.  Maternal Fetal Medicine     20 minutes spent in review of records, patient consultation, documentation and coordination of care.  The relevant clinical matter(s) are summarized above.     Note to patient and family  The 21st Century Cures Act makes medical notes available to patients in the interest of transparency.  However, please be advised that this is a medical document.  It is intended as pjhk-mn-thnm communication.  It is written and medical language may contain abbreviations or verbiage that are technical and unfamiliar.  It may appear blunt or direct.  Medical documents are  intended to carry relevant information, facts as evident, and the clinical opinion of the practitioner.

## 2024-01-11 ENCOUNTER — ROUTINE PRENATAL (OUTPATIENT)
Dept: OBGYN CLINIC | Facility: CLINIC | Age: 29
End: 2024-01-11
Payer: COMMERCIAL

## 2024-01-11 ENCOUNTER — APPOINTMENT (OUTPATIENT)
Dept: OBGYN CLINIC | Facility: CLINIC | Age: 29
End: 2024-01-11
Payer: COMMERCIAL

## 2024-01-11 VITALS
BODY MASS INDEX: 47 KG/M2 | DIASTOLIC BLOOD PRESSURE: 70 MMHG | WEIGHT: 274.81 LBS | HEART RATE: 106 BPM | SYSTOLIC BLOOD PRESSURE: 122 MMHG

## 2024-01-11 DIAGNOSIS — E66.01 MORBID OBESITY WITH BMI OF 40.0-44.9, ADULT (HCC): Primary | ICD-10-CM

## 2024-01-11 DIAGNOSIS — O09.93 SUPERVISION OF HIGH RISK PREGNANCY IN THIRD TRIMESTER: ICD-10-CM

## 2024-01-11 DIAGNOSIS — F25.9 SCHIZO AFFECTIVE SCHIZOPHRENIA (HCC): ICD-10-CM

## 2024-01-11 DIAGNOSIS — D25.9 LEIOMYOMA OF UTERUS AFFECTING PREGNANCY, ANTEPARTUM: ICD-10-CM

## 2024-01-11 DIAGNOSIS — F33.3 MDD (MAJOR DEPRESSIVE DISORDER), RECURRENT, SEVERE, WITH PSYCHOSIS (HCC): ICD-10-CM

## 2024-01-11 DIAGNOSIS — O09.891 MEDICATION EXPOSURE DURING FIRST TRIMESTER OF PREGNANCY: ICD-10-CM

## 2024-01-11 DIAGNOSIS — O34.10 LEIOMYOMA OF UTERUS AFFECTING PREGNANCY, ANTEPARTUM: ICD-10-CM

## 2024-01-11 DIAGNOSIS — F12.20 CANNABIS USE DISORDER, SEVERE, DEPENDENCE (HCC): ICD-10-CM

## 2024-01-11 PROCEDURE — 3074F SYST BP LT 130 MM HG: CPT | Performed by: OBSTETRICS & GYNECOLOGY

## 2024-01-11 PROCEDURE — 3078F DIAST BP <80 MM HG: CPT | Performed by: OBSTETRICS & GYNECOLOGY

## 2024-01-11 PROCEDURE — 59025 FETAL NON-STRESS TEST: CPT | Performed by: OBSTETRICS & GYNECOLOGY

## 2024-01-11 NOTE — PROGRESS NOTES
CHUY    GA: 34w0d  Vitals:    24 0952   BP: 122/70   Pulse: 106   Weight: 274 lb 12.8 oz (124.6 kg)       Doing well, +FM  Denies LOF/VB/uctx  Rh +, TDAP received, EPDS 0  PTL and Fetal movement instructions given  Reports feeling cold-like symptoms.  Patient reports taking Benadryl as needed.  Recommend OTC remedies including Tylenol, rest, hydration, Mucinex and Robitussin as needed.  Precautions provided.    Assessment:     Deshawn Valera is a 28 year old  at 34w0d who presents for routine OB visit. Overall doing well.     Problem List Items Addressed This Visit          HCC Problems    Morbid obesity with BMI of 40.0-44.9, adult (HCC) - Primary    Relevant Orders    Fetal Non Stress Test [38211] (Completed)    Cannabis use disorder, severe, dependence ([facility])    MDD (major depressive disorder), recurrent, severe, with psychosis ([facility])    Schizo affective schizophrenia ([facility])    Relevant Orders    Fetal Non Stress Test [95213] (Completed)       Gravid and     Medication exposure during first trimester of pregnancy    Relevant Orders    Fetal Non Stress Test [49293] (Completed)    Leiomyoma of uterus affecting pregnancy, antepartum    Relevant Orders    Fetal Non Stress Test [30612] (Completed)    Supervision of high risk pregnancy in third trimester    Relevant Orders    Fetal Non Stress Test [50719] (Completed)           Plan:     Patient Active Problem List    Diagnosis    Supervision of high risk pregnancy in third trimester    Leiomyoma of uterus affecting pregnancy, antepartum    Obesity in pregnancy     If she wants to continue metformin for entire pregnancy, then repeat 3 hr gtt while using metformin at 24-28 weeks.  If she wants to discontinue metformin, then discontinue it 1 week prior to 3 hr gtt between 24 & 28 weeks  [X] MFM  Monthly growth ultrasounds, add BPP to each growth ultrasound at 32 weeks and beyond  Weekly NSTs at 34 weeks  Limit weight gain to 11-20  pounds.  Delivery at 39-40 weeks         Medication exposure during first trimester of pregnancy    Schizo affective schizophrenia (HCC)     Continue zuprasidone and co-management with her mental health team        MDD (major depressive disorder), recurrent, severe, with psychosis (HCC)    Suicidal behavior with attempted self-injury (HCC)    Anxiety disorder, unspecified    Psychosis (HCC)    Cannabis use disorder, severe, dependence (HCC)    Morbid obesity with BMI of 40.0-44.9, adult (HCC)     [X] MFM US  Monthly growth ultrasounds starting at 28 weeks, add BPP to each growth ultrasound at 32 weeks and beyond  Weekly NSTs at 34 weeks  Limit weight gain to 11-20 pounds.  Delivery at 39-40 weeks            - continue routine prenatal care   - labor and rupture of membrane precautions provided    NST reactive and reassuring    Return to clinic in 1 week for CHUY visit w/ NST        Note to patient and family   The 21st Century Cures Act makes medical notes available to patients in the interest of transparency.  However, please be advised that this is a medical document.  It is intended as qyek-ef-lfsm communication.  It is written and medical language may contain abbreviations or verbiage that are technical and unfamiliar.  It may appear blunt or direct.  Medical documents are intended to carry relevant information, facts as evident, and the clinical opinion of the practitioner.

## 2024-01-11 NOTE — PATIENT INSTRUCTIONS
Medications Safe in Pregnancy  The following over-the-counter medications may be taken safely after 12 weeks gestation by any patient who is pregnant.  Please follow the instructions on the package for adult dosage.  If you experience any symptoms prior to 12 weeks, please call the office at [phone].      Colds/Congestion: Flonase, Saline Nasal Spray, Neti Pot, Plain Robitussin, Robitussin DM, Mucinex DM, Vicks 44 E, Vicks Vapor rub, Cough drops without Zinc. You may use Sudafed for a short amount of time.   Contact your doctor if you have a persistent fever over 100.4, shortness of breath, coughing up green mucus, or a sore throat that persists from more than 3 days    Diarrhea: Imodium, Maalox Anti-Diarrheal or Kaopectate  Contact the office if you have diarrhea for more than 3 days.    Allergies: Benadryl, Claritin or Zyrtec    Hemorrhoids: Tucks pads, Preparation H, Annusol, Sitz bath or Witch hazel  Eat a high fiber diet and drink plenty of fluids.    Yeast: Monistat 3 or 7  Call if your symptoms do not improve, or if you experience vaginal bleeding, or a watery discharge.    Constipation: Metamucil, Colace, fiber supplement, Milk of Magnesia or Dulcolax  Drink plenty of fluids, eat high-fiber foods and take the above laxatives sporadically.     Headache or Mild aches and pain: Extra Strength Tylenol     Gas: Gas X, Gelusil, Papaya Tablets, Maalox, Mylicon or Mylanta Gas    Heartburn: Tums, Mylanta, Pepcid AC or Maalox    Sore throat: Alcohol free Chloraseptic spray or Lozenges     Nausea and Vomiting: ½ tablet Unisom plus 100mg of Vitamin B6 at bedtime (may increase B6 up to 200mg per day), Lana root tea or raspberry leaf tea    Insomnia: Tylenol PM, Vitamin B6 50mg, warm bath or milk, Unisom, Nytol or Sominex.     We have listed a few medications for common symptoms seen in pregnancy.  Please contact the office if you are unsure about any over the counter medications that are not listed above.         Labor Instructions    How do I know if it’s true labor?  One of the most important aspects of any pregnancy is being able to recognize the onset of labor.  Unfortunately, on occasion it can be difficult or confusing, especially if you have had one or more children.  Each labor can begin in a different way even if you have had four or five children.    If this is your first child, it is very common to have labor for an average greater than 10 hours; however, there have been rare instances for labor to be two hours or less for a first time mother. It is more important for you to know if this is your second or third baby to realize that any labor after the first is usually shorter.  There is no way to tell how long or short the labor will be. Therefore, please call us if you are unsure labor has started.       Usually, during the last six weeks of pregnancy, Navi-Christensen contractions or “false labor pains occur”.  False labor is generally not very painful though it is not always easy to tell.  You may feel contractions, cramps or uterine tightening somewhere between every 3-30 minutes but they will not continue to get stronger over time.  If you lie down, drink plenty of fluid or walk around, the contractions may go away.   False contractions are very common if you have been active on your feet for several hours.   Women frequently worry about being sent home from the hospital without having their baby (i.e. the labor stopped).  Actually, this is an unnecessary worry, for this is an infrequent occurrence.     True labor usually begins in one of two ways.  In most patients it begins with contractions of the uterus, which are irregular (but not always) in the beginning.  They are cramp-like in character and feel similar to menstrual cramps.  After a while, they become more regular, and they seem to last a little longer, and feel a little sharper.  These symptoms are very important-more important- than the timing  of the contractions.  Having regular (usually closer), longer lasting (35-70 seconds), and sharper (more painful) contractions are the common symptoms of actual labor.  The second way in which labor can begin which occurs in approximately 30% of all patients is the rupture of the bag of water.  This is a sudden gush of watery fluid, usually sufficient to run down your leg and onto the floor, or you may wet a large area of the bed if it happens at night.  There may also be tricking that is uncontrolled.  If you are unsure, please call the office.      When should I call?  When contractions are strong and every 3-5 minutes.  If you have a gush of water or you think you might be leaking fluid.  If you are bleeding heavily.  If your baby is not moving around at least every 1-2 hours.  If you are worried about something.  When you think you are ready to go to the hospital.    Who do I call?  Call the office at [phone].  If the office is closed, the answering service will send a message to the physician on call.  The on call physician will be available for emergency phone calls only.          Can I eat in labor?  It is good to eat a light meal at home before going to the hospital.  Eat foods like crackers, popsicles, soup and fruit.  Avoid foods that are difficult to digest like meat, a lot of dairy products and high fat foods.  DO NOT EAT if you know you are scheduled for a  ().      What will happen when I get to the hospital?  When you arrive at the hospital, you will be admitted and examined.   There are a few factors that will determine if you will be allowed to be up out of bed or if you would need to stay in bed.  The main factors are how well the baby is entering into the pelvis and if the bag of solorio is in intact or ruptured.  An intravenous (IV) solution will, with exceptions, be started.  This is extremely important especially for the baby.   Your  will be allowed in the room during  your labor. During the delivery, the nurses will inform you of the hospital policy and how many coaches are allowed.  You may desire pain medication or anesthesia for pain.  You probably discussed some aspects of pain medication with us during your prenatal care.  The various options may also have been discussed in Prenatal Classes.  We utilize IV narcotics and epidural anesthesia when our patients request to have them.  If you chose to have no anesthesia, none will be administered.  A local anesthetic may be used at the time of delivery      What should I to bring to the hospital?  Maternity clothes to go home in  You can bring your own night gown to wear after giving birth, but most women prefer to wear the hospital gown because it may get soiled  Nursing Bra if you are planning to breastfeed  Clothes for your baby to go home in  Baby Car Seat.  Be sure you know how to install it correctly. Please install it before going to the hospital  Routine toiletries like toothbrush, shampoo, hairbrush and etc.   You can bring your favorite pillow, but please put a colored pillow case on it so it doesn’t get mixed up with hospital pillows    How long will I stay in the hospital?  The date you leave the hospital may vary depending on the speed of your recovery.  If you have a vaginal delivery, you will stay in the hospital 24-48 hours after your delivery as long as you aren’t having any complications.    If you have had a , you will stay in the hospital 48-72 hours as long as you aren’t having any complications.       Going Home Instructions  There are no set rules as to what you may do each day or week after you are home.  You will receive discharge instructions to help you each day.  Remember, early ambulation in the hospital is to prevent complications.  Do not let this lull you into a false sense of strength or ability to do certain physical acts which may tire you excessively.  Please call the office within a few  days after you are discharged from the hospital to schedule your post-partum visit, which is usually 4-6 weeks after delivery.    Any medications necessary will be discussed on an individual basis.  If you decide to breastfeed your baby, you should continue your prenatal vitamins.  If you do not breastfeed, simply finish the prenatal vitamins you have.      The staff at [facility] wish you a joyous and exciting birth.  If there is anything we can do to make this a better experience for you please do not hesitate to ask.

## 2024-01-22 ENCOUNTER — APPOINTMENT (OUTPATIENT)
Dept: OBGYN CLINIC | Facility: CLINIC | Age: 29
End: 2024-01-22
Payer: MEDICAID

## 2024-01-22 ENCOUNTER — ROUTINE PRENATAL (OUTPATIENT)
Dept: OBGYN CLINIC | Facility: CLINIC | Age: 29
End: 2024-01-22
Payer: MEDICAID

## 2024-01-22 VITALS
BODY MASS INDEX: 47.15 KG/M2 | WEIGHT: 276.19 LBS | DIASTOLIC BLOOD PRESSURE: 72 MMHG | SYSTOLIC BLOOD PRESSURE: 116 MMHG | HEIGHT: 64 IN

## 2024-01-22 DIAGNOSIS — O09.891 MEDICATION EXPOSURE DURING FIRST TRIMESTER OF PREGNANCY: ICD-10-CM

## 2024-01-22 DIAGNOSIS — O09.93 SUPERVISION OF HIGH RISK PREGNANCY IN THIRD TRIMESTER: ICD-10-CM

## 2024-01-22 DIAGNOSIS — E66.01 MORBID OBESITY WITH BMI OF 40.0-44.9, ADULT (HCC): Primary | ICD-10-CM

## 2024-01-22 NOTE — PROGRESS NOTES
CHUY  Doing well, +FM  Denies VB/LOF/uctx but some [place] Christensen  Mode of delivery:  anticipated  Labor/  labor precautions discussed  RTC 1 week with NST  NST was reactive

## 2024-01-22 NOTE — PROGRESS NOTES
Chief Complaint   Patient presents with    Prenatal Care     Melvin/nst       28 year old  at 35w4d who presents for routine OB visit without complaints. Doing well.   Patient denies any bleeding, leaking fluid, cramping, or contractions.  Assessment/Plan:   35w4d doing well.  Continue routine prenatal care  Kick counts reminded  Reviewed  labor signs and symptoms.    Will plan GBS and cervical exam next visit.    She has a growth US with BPP 2024 with MFM    Diagnoses and all orders for this visit:    Morbid obesity with BMI of 40.0-44.9, adult (HCC)  -     Fetal Non Stress Test [95642]    Medication exposure during first trimester of pregnancy  -     Fetal Non Stress Test [92749]    Supervision of high risk pregnancy in third trimester  -     Fetal Non Stress Test [52753]      Return in about 1 week (around 2024) for with NST.     Subjective:   Review of Systems:  General: no complaints per category. See HPI for additional information.   Breast: no complaints per category. See HPI for additional information.   Respiratory: no complaints per category. See HPI for additional information.   Cardiovascular: no complaints per category. See HPI for additional information.   GI: no complaints per category. See HPI for additional information.   : no complaints per category. See HPI for additional information.   Heme: no complaints per category. See HPI for additional information.   Obstetrical History:   OB History    Para Term  AB Living   1             SAB IAB Ectopic Multiple Live Births                  # Outcome Date GA Lbr Rudy/2nd Weight Sex Delivery Anes PTL Lv   1 Current              Gynecological History: na  Medications:  No outpatient medications have been marked as taking for the 24 encounter (Routine Prenatal) with Chelsi Francisco APN.      Allergies:  Allergies   Allergen Reactions    Algae Extract HIVES    Other HIVES     Seaweed     Pollen OTHER (SEE COMMENTS)      \"stuffy nose\"    Seasonal ITCHING     Past Medical History:  Past Medical History:   Diagnosis Date    Anxiety     Depression     Hyperlipidemia     Nonepileptic episode (HCC)     Obesity in pregnancy 08/24/2023    Schizo affective schizophrenia (HCC)     Shaking      Past Surgical History:  History reviewed. No pertinent surgical history.  Immunizations:   Immunization History   Administered Date(s) Administered    Covid-19 Vaccine Moderna 100 mcg/0.5 ml 03/21/2021, 04/18/2021    Covid-19 Vaccine Moderna 50 Mcg/0.25 Ml 02/07/2022    DTAP 12/20/1995, 02/13/1996, 04/18/1996, 04/18/1997, 09/17/2001    DTAP INFANRIX 12/20/1995, 02/13/1996, 04/18/1996, 04/18/1997, 09/27/2001    HEP B 10/16/1995, 11/28/1995, 06/27/1996    HEP B, Ped/Adol 10/16/1995, 11/28/1995, 06/27/1996    HPV (Gardasil) 08/06/2010    Hpv Virus Vaccine 9 Noris Im 05/22/2017, 10/27/2017    IPV 12/20/1995, 02/13/1996, 04/18/1996, 04/18/1997, 09/27/2001    MMR 01/17/1997, 09/27/2001    Meningococcal (Menomune) 08/06/2010    TDAP 08/06/2010, 03/30/2019, 12/28/2023     Objective:     Vitals:    01/22/24 1008   BP: 116/72   Weight: 276 lb 3.2 oz (125.3 kg)   Height: 64\"     Body mass index is 47.41 kg/m².  Physical Examination:  General appearance: Well dressed, well nourished in no apparent distress  Neurologic/Psychiatric: Alert and oriented to person, place and time, mood normal, affect appropriate  Patient Active Problem List    Diagnosis    Supervision of high risk pregnancy in third trimester    Leiomyoma of uterus affecting pregnancy, antepartum    Obesity in pregnancy     If she wants to continue metformin for entire pregnancy, then repeat 3 hr gtt while using metformin at 24-28 weeks.  If she wants to discontinue metformin, then discontinue it 1 week prior to 3 hr gtt between 24 & 28 weeks  [X] MFM  Monthly growth ultrasounds, add BPP to each growth ultrasound at 32 weeks and beyond  Weekly NSTs at 34 weeks  Limit weight gain to 11-20  pounds.  Delivery at 39-40 weeks         Medication exposure during first trimester of pregnancy    Schizo affective schizophrenia (HCC)     Continue zuprasidone and co-management with her mental health team        MDD (major depressive disorder), recurrent, severe, with psychosis (HCC)    Suicidal behavior with attempted self-injury (HCC)    Anxiety disorder, unspecified    Psychosis (HCC)    Cannabis use disorder, severe, dependence (HCC)    Morbid obesity with BMI of 40.0-44.9, adult (HCC)     [X] MFM US  Monthly growth ultrasounds starting at 28 weeks, add BPP to each growth ultrasound at 32 weeks and beyond  Weekly NSTs at 34 weeks  Limit weight gain to 11-20 pounds.  Delivery at 39-40 weeks            Total patient time was 20 minutes in reviewing paper/electronic records, reviewing test results from outside care provider, obtaining history, evaluating the patient, consultation, discussing treatment options, coordination of care and completing documentation. This included face to face and non-face to face actions. The patient's questions and concerns were addressed.

## 2024-01-26 PROBLEM — F33.3 MDD (MAJOR DEPRESSIVE DISORDER), RECURRENT, SEVERE, WITH PSYCHOSIS (HCC): Status: RESOLVED | Noted: 2022-10-28 | Resolved: 2024-01-26

## 2024-01-26 PROBLEM — F29 PSYCHOSIS (HCC): Status: RESOLVED | Noted: 2021-09-17 | Resolved: 2024-01-26

## 2024-01-26 PROBLEM — E66.01 MORBID OBESITY WITH BMI OF 40.0-44.9, ADULT (HCC): Status: RESOLVED | Noted: 2017-05-23 | Resolved: 2024-01-26

## 2024-01-31 ENCOUNTER — ULTRASOUND ENCOUNTER (OUTPATIENT)
Dept: PERINATAL CARE | Facility: HOSPITAL | Age: 29
End: 2024-01-31
Attending: OBSTETRICS & GYNECOLOGY
Payer: COMMERCIAL

## 2024-01-31 VITALS
SYSTOLIC BLOOD PRESSURE: 122 MMHG | HEIGHT: 64 IN | BODY MASS INDEX: 48.65 KG/M2 | WEIGHT: 285 LBS | HEART RATE: 112 BPM | DIASTOLIC BLOOD PRESSURE: 73 MMHG

## 2024-01-31 DIAGNOSIS — Z03.75 SUSPECTED SHORTENING OF CERVIX NOT FOUND: ICD-10-CM

## 2024-01-31 DIAGNOSIS — F25.9 SCHIZO AFFECTIVE SCHIZOPHRENIA (HCC): ICD-10-CM

## 2024-01-31 DIAGNOSIS — O34.13 LEIOMYOMA OF UTERUS AFFECTING PREGNANCY IN THIRD TRIMESTER: ICD-10-CM

## 2024-01-31 DIAGNOSIS — O09.891 MEDICATION EXPOSURE DURING FIRST TRIMESTER OF PREGNANCY: ICD-10-CM

## 2024-01-31 DIAGNOSIS — O99.213 MATERNAL MORBID OBESITY IN THIRD TRIMESTER, ANTEPARTUM (HCC): ICD-10-CM

## 2024-01-31 DIAGNOSIS — E66.01 MORBID OBESITY WITH BMI OF 40.0-44.9, ADULT (HCC): ICD-10-CM

## 2024-01-31 DIAGNOSIS — E66.01 MATERNAL MORBID OBESITY IN THIRD TRIMESTER, ANTEPARTUM (HCC): Primary | ICD-10-CM

## 2024-01-31 DIAGNOSIS — O99.213 MATERNAL MORBID OBESITY IN THIRD TRIMESTER, ANTEPARTUM (HCC): Primary | ICD-10-CM

## 2024-01-31 DIAGNOSIS — F12.20 CANNABIS USE DISORDER, SEVERE, DEPENDENCE (HCC): ICD-10-CM

## 2024-01-31 DIAGNOSIS — D25.9 LEIOMYOMA OF UTERUS AFFECTING PREGNANCY IN THIRD TRIMESTER: ICD-10-CM

## 2024-01-31 DIAGNOSIS — E66.01 MATERNAL MORBID OBESITY IN THIRD TRIMESTER, ANTEPARTUM (HCC): ICD-10-CM

## 2024-01-31 DIAGNOSIS — F29 PSYCHOSIS (HCC): ICD-10-CM

## 2024-01-31 PROCEDURE — 76816 OB US FOLLOW-UP PER FETUS: CPT | Performed by: OBSTETRICS & GYNECOLOGY

## 2024-01-31 PROCEDURE — 76819 FETAL BIOPHYS PROFIL W/O NST: CPT

## 2024-01-31 NOTE — PROGRESS NOTES
Pt here for growth ultrasound  + FM  Pt states feeling occas ctx, denies vag bleeding and leaking fluid.

## 2024-01-31 NOTE — PROGRESS NOTES
Outpatient Maternal-Fetal Medicine Consultation    Dear Dr. Angeles    Thank you for requesting ultrasound evaluation and maternal fetal medicine consultation on your patient Deshawn Valera.  As you are aware she is a 28 year old female  with a rajput pregnancy and an Estimated Date of Delivery: 24.  A maternal-fetal medicine f/u is today.   Her prenatal records and labs were reviewed.    Doing well on ziprasidone.    ROS    HISTORY  OB History    Para Term  AB Living   1             SAB IAB Ectopic Multiple Live Births                  # Outcome Date GA Lbr Rudy/2nd Weight Sex Delivery Anes PTL Lv   1 Current                Allergies:  Allergies   Allergen Reactions    Algae Extract HIVES    Other HIVES     Seaweed     Pollen OTHER (SEE COMMENTS)     \"stuffy nose\"    Seasonal ITCHING      Current Meds:  Current Outpatient Medications   Medication Sig Dispense Refill    ziprasidone 20 MG Oral Cap Take 1 capsule (20 mg total) by mouth 2 (two) times daily with meals. 60 capsule 2    METFORMIN 500 MG Oral Tab TAKE 1 TABLET(500 MG) BY MOUTH TWICE DAILY WITH MEALS 180 tablet 0    prenatal vitamin with DHA 27-0.8-228 MG Oral Cap Take 1 capsule by mouth daily.      Ferrous Sulfate (IRON OR) Take 1 tablet by mouth daily.          HISTORY:  Past Medical History:   Diagnosis Date    Anxiety     Depression     Hyperlipidemia     MDD (major depressive disorder), recurrent, severe, with psychosis ([facility]) 10/28/2022    Morbid obesity with BMI of 40.0-44.9, adult ([facility]) 2017    [X] [facility] US  Monthly growth ultrasounds starting at 28 weeks, add BPP to each growth ultrasound at 32 weeks and beyond  Weekly NSTs at 34 weeks  Limit weight gain to 11-20 pounds.  Delivery at 39-40 weeks        Nonepileptic episode (HCC)     Obesity in pregnancy 2023    Psychosis ([facility]) 2021    Schizo affective schizophrenia (HCC)     Shaking     Suicidal behavior with attempted self-injury ([facility])       No past  surgical history on file.   Family History   Problem Relation Age of Onset    Schizophrenia Father     Schizophrenia Mother     Schizophrenia Paternal Grandmother       Social History     Socioeconomic History    Marital status: Single   Tobacco Use    Smoking status: Never    Smokeless tobacco: Never   Vaping Use    Vaping Use: Never used   Substance and Sexual Activity    Alcohol use: Not Currently     Comment: occasional    Drug use: Not Currently     Types: Cannabis     Comment: occasional     Social Determinants of Health     Financial Resource Strain: High Risk (8/24/2023)    Financial Resource Strain     Difficulty of Paying Living Expenses: Very hard     Med Affordability: Yes   Food Insecurity: Food Insecurity Present (8/24/2023)    Food Insecurity     Food Insecurity: Often true   Transportation Needs: No Transportation Needs (8/24/2023)    Transportation Needs     Lack of Transportation: No   Stress: Stress Concern Present (8/24/2023)    Stress     Feeling of Stress : Yes   Housing Stability: Low Risk  (8/24/2023)    Housing Stability     Housing Instability: No          PHYSICAL EXAMINATION:  /88 (BP Location: Right arm, Patient Position: Sitting, Cuff Size: large)   Pulse 116   Ht 5' 4\" (1.626 m)   Wt 285 lb (129.3 kg)   LMP  (LMP Unknown)   BMI 48.92 kg/m²       Physical Exam  Constitutional:       Appearance: Normal appearance. She is obese. She is not ill-appearing.   Pulmonary:      Effort: Pulmonary effort is normal.   Abdominal:      Palpations: Abdomen is soft.      Tenderness: There is no abdominal tenderness.   Neurological:      Mental Status: She is alert and oriented to person, place, and time.   Psychiatric:         Mood and Affect: Mood normal.         Behavior: Behavior normal.         DISCUSSION  During her visit we discussed and reviewed the following issues:  OBESITY:  Her BMI prior to pregnancy was 43   Please see previous M detailed discussion.     Prevention of  Preeclampsia   Please see previous [facility] detailed discussion.   PSYCHIATRIC DISORDERS  DISORDERS IN PREGNANCY     She has a history of schizo affective schizophrenia disorder.  Last suicide attempt and psychosis was approximately a year ago.  Since that time she has been on ziprasidone.  She has been doing better.  She is with her mom today and lives with her mom Opal.  Her mom notes that she previously has used cannabis by smoking.  She is stopped that since she is pregnant.  Her mom also notices that there are less issues with hearing voices when she is not using the cannabis.   Please see previous [facility] detailed discussion.     Ziprasidone -    It is primarily used for the treatment of schizophrenia; treatment of acute manic or mixed episodes associated with bipolar disorder with or without psychosis; maintenance treatment of bipolar disorder as an adjunct to lithium or valproate; acute agitation in patients with schizophrenia   Off- Label uses:  Delusional parasitosis; Major depressive disorder; Psychosis/agitation associated with dementia Please see previous [facility] detailed discussion.   Healthcare providers are encouraged to enroll women 18-45 years of age exposed to ziprasidone during pregnancy in the Atypical Antipsychotics Pregnancy Registry ([phone] or [email or web address]).     Breast-Feeding Considerations   It is not known if ziprasidone is excreted into breast milk. Breast-feeding is not recommended by the .         FIBROIDS:  I informed her of the fibroids.  Fibroids can increase in size during pregnancy and could lead to abdominal pain with an increased risk of  labor.  Most cases are unremarkable.  Fibroids can obstruct the pelvis and increase the risk for a . We discussed the morbidity and mortality associated with prematurity at various gestational ages.  The signs and symptoms of  labor were discussed.        METFORMIN     She is  on metformin for insulin resistance.  Please see previous MFM detailed discussion.       She wants to continue the metformin, I recommended that she do the 3-hour glucose test while taking it.  If she would like to discontinue the metformin, then the 3-hour glucose test at 24 to 28 weeks should be done after she has been off the metformin for at least a week.    Lab Results   Component Value Date    GLUFG 91 11/27/2023    GLU1G 161 11/27/2023    GLU2G 123 11/27/2023    GLU3G 108 11/27/2023      Signs and symptoms of preeclampsia were reviewed.     OB ULTRASOUND REPORT   See imaging tab for complete ultrasound report or in PACS    Ultrasound Findings:  Single IUP in cephalic presentation.    Placenta is posterior.   Cardiac activity is present at 132 bpm  EFW 2612 g ( 5 lb 12 oz); 19%.    EDELMIRA is  22.5 cm.  MVP is 6.4 cm  BPP is 8/8.     The fetal measurements are consistent with established EDC. No gross ultrasound evidence of structural abnormalities are seen today. The patient understands that ultrasound cannot rule out all structural and chromosomal abnormalities.     Head measurements are within normal range.    IMPRESSION:  IUP at 36w6d   Schizoaffective schizophrenia disorder  Class III Obesity  Uterine fibroid  Appropriate growth overall and in abdomen.      RECOMMENDATIONS:  Continue care with Dr. Angeles  Monthly growth ultrasounds, add BPP to each growth ultrasound at 32 weeks and beyond  Weekly NSTs at 34 weeks  Limit weight gain to 11-20 pounds.  Delivery at 39-40 weeks   Continue zuprasidone and co-management with her mental health team        Thank you for allowing me to participate in the care of your patient.  Please do not hesitate to contact me if additional questions or concerns arise.      Joycelyn Paris MD   Maternal Fetal Medicine     30 minutes spent in review of records, patient consultation, documentation and coordination of care.  The relevant clinical matter(s) are summarized above.     Note  to patient and family  The 21st Century Cures Act makes medical notes available to patients in the interest of transparency.  However, please be advised that this is a medical document.  It is intended as [id number] communication.  It is written and medical language may contain abbreviations or verbiage that are technical and unfamiliar.  It may appear blunt or direct.  Medical documents are intended to carry relevant information, facts as evident, and the clinical opinion of the practitioner.

## 2024-02-01 ENCOUNTER — APPOINTMENT (OUTPATIENT)
Dept: OBGYN CLINIC | Facility: CLINIC | Age: 29
End: 2024-02-01
Payer: MEDICAID

## 2024-02-01 ENCOUNTER — ROUTINE PRENATAL (OUTPATIENT)
Dept: OBGYN CLINIC | Facility: CLINIC | Age: 29
End: 2024-02-01
Payer: MEDICAID

## 2024-02-01 VITALS — DIASTOLIC BLOOD PRESSURE: 72 MMHG | BODY MASS INDEX: 41 KG/M2 | SYSTOLIC BLOOD PRESSURE: 120 MMHG | WEIGHT: 238.5 LBS

## 2024-02-01 DIAGNOSIS — Z3A.37 37 WEEKS GESTATION OF PREGNANCY: Primary | ICD-10-CM

## 2024-02-01 PROCEDURE — 59025 FETAL NON-STRESS TEST: CPT | Performed by: STUDENT IN AN ORGANIZED HEALTH CARE EDUCATION/TRAINING PROGRAM

## 2024-02-01 PROCEDURE — 3074F SYST BP LT 130 MM HG: CPT | Performed by: STUDENT IN AN ORGANIZED HEALTH CARE EDUCATION/TRAINING PROGRAM

## 2024-02-01 PROCEDURE — 87081 CULTURE SCREEN ONLY: CPT | Performed by: STUDENT IN AN ORGANIZED HEALTH CARE EDUCATION/TRAINING PROGRAM

## 2024-02-01 PROCEDURE — 3078F DIAST BP <80 MM HG: CPT | Performed by: STUDENT IN AN ORGANIZED HEALTH CARE EDUCATION/TRAINING PROGRAM

## 2024-02-01 PROCEDURE — 87150 DNA/RNA AMPLIFIED PROBE: CPT | Performed by: STUDENT IN AN ORGANIZED HEALTH CARE EDUCATION/TRAINING PROGRAM

## 2024-02-01 NOTE — PROGRESS NOTES
[facility]  Obstetrics and Gynecology  Routine OB visit Progress Note    Subjective:     Deshawn Valera is a 28 year old  at 37w0d who presents for routine OB visit.  Patient reports doing well.   Denies contractions, vaginal bleeding or leakage of fluid.   Good fetal movement.    Review of Systems:  General: no complaints per category. See HPI for additional information.   Breast: no complaints per category. See HPI for additional information.   Respiratory: no complaints per category. See HPI for additional information.   Cardiovascular: no complaints per category. See HPI for additional information.   GI: no complaints per category. See HPI for additional information.   : no complaints per category. See HPI for additional information.   Heme: no complaints per category. See HPI for additional information.     History/Other:     Obstetrical History:   OB History    Para Term  AB Living   1             SAB IAB Ectopic Multiple Live Births                  # Outcome Date GA Lbr Rudy/2nd Weight Sex Delivery Anes PTL Lv   1 Current                  Gynecological History:     No LMP recorded (lmp unknown). Patient is pregnant.      Medications:   ziprasidone 20 MG Oral Cap Take 1 capsule (20 mg total) by mouth 2 (two) times daily with meals. 60 capsule 2    METFORMIN 500 MG Oral Tab TAKE 1 TABLET(500 MG) BY MOUTH TWICE DAILY WITH MEALS 180 tablet 0    prenatal vitamin with DHA 27-0.8-228 MG Oral Cap Take 1 capsule by mouth daily.          Allergies:  Allergies   Allergen Reactions    Algae Extract HIVES    Other HIVES     Seaweed     Pollen OTHER (SEE COMMENTS)     \"stuffy nose\"    Seasonal ITCHING       Past Medical History:  Past Medical History:   Diagnosis Date    Anxiety     Depression     Hyperlipidemia     MDD (major depressive disorder), recurrent, severe, with psychosis ([facility]) 10/28/2022    Morbid obesity with BMI of 40.0-44.9, adult ([facility]) 2017    [X] [facility]  Monthly  growth ultrasounds starting at 28 weeks, add BPP to each growth ultrasound at 32 weeks and beyond  Weekly NSTs at 34 weeks  Limit weight gain to 11-20 pounds.  Delivery at 39-40 weeks        Nonepileptic episode (HCC)     Obesity in pregnancy 08/24/2023    Psychosis (HCC) 09/17/2021    Schizo affective schizophrenia (HCC)     Shaking     Suicidal behavior with attempted self-injury (HCC)        Past Surgical History:  History reviewed. No pertinent surgical history.    Immunizations:   Immunization History   Administered Date(s) Administered    Covid-19 Vaccine Moderna 100 mcg/0.5 ml 03/21/2021, 04/18/2021    Covid-19 Vaccine Moderna 50 Mcg/0.25 Ml 02/07/2022    DTAP 12/20/1995, 02/13/1996, 04/18/1996, 04/18/1997, 09/17/2001    DTAP INFANRIX 12/20/1995, 02/13/1996, 04/18/1996, 04/18/1997, 09/27/2001    HEP B 10/16/1995, 11/28/1995, 06/27/1996    HEP B, Ped/Adol 10/16/1995, 11/28/1995, 06/27/1996    HPV (Gardasil) 08/06/2010    Hpv Virus Vaccine 9 Noris Im 05/22/2017, 10/27/2017    IPV 12/20/1995, 02/13/1996, 04/18/1996, 04/18/1997, 09/27/2001    MMR 01/17/1997, 09/27/2001    Meningococcal (Menomune) 08/06/2010    TDAP 08/06/2010, 03/30/2019, 12/28/2023           Objective:     Objective:       Vitals:    02/01/24 1139   BP: 120/72   Weight: 238 lb 8 oz (108.2 kg)         Body mass index is 40.94 kg/m².    General: AAO.NAD.   CVS exam: normal peripheral perfusion  Chest: non-labored breathing, no tachypnea   Abdominal exam: gravid   Pelvic exam: 1/80/-2  VULVA: normal appearing vulva with no masses, tenderness or lesions  PERINEUM:  normal appearing, no lesions     Labs:  Prenatal Results       Initial Prenatal Labs EDWARD (GA 0-24w)       Test Value Reference Range Date Time    ABO Group  O   09/07/23 1158    RH Type  Positive   09/07/23 1158    Antibody Screen OB  Negative   09/07/23 1158    Rubella Titer OB  Positive  Positive 09/07/23 1158    Hep B Surf Ag OB  Nonreactive  Nonreactive  09/07/23 1158    RPR Serology         TREP  Nonreactive  Nonreactive  09/07/23 1158    HIV Combo Result OB  Non-Reactive  Non-Reactive 09/07/23 1158    HGB  11.6 g/dL 12.0 - 16.0 09/07/23 1158    HCT  37.0 % 35.0 - 48.0 09/07/23 1158    MCV  84.9 fL 80.0 - 100.0 09/07/23 1158    Platelets  288.0 10(3)uL 150.0 - 450.0 09/07/23 1158    Urine Culture  No Growth at 18-24 hrs.   08/24/23 1538    HCV  Nonreactive  Nonreactive  09/07/23 1158              Additional Prenatal Labs (GA 0-24w)       Test Value Reference Range Date Time    Chlamydia with Pap  Negative  Negative 08/24/23 1538    GC with Pap  Negative  Negative 08/24/23 1538    Chlamydia        GC         Pap Smear  Negative for intraepithelial lesion or malignancy  Negative for intraepithelial lesion or malignancy 12/06/21 1343    HPV        Sickel Cell Solubility HGB                  2nd Trimester Labs (GA 24-41w)       Test Value Reference Range Date Time    Antibody Screen OB        Serology RPR        HGB        HCT        Glucose 1 hour  149 mg/dL See comment 09/28/23 1052    Glucose Everardo 3 hr Gestational Fasting  91 mg/dL See comment 11/27/23 0838    1 Hour glucose  161 mg/dL See comment 11/27/23 0943    2 Hour glucose  123 mg/dL See comment 11/27/23 1042    3 hour glucose  108 mg/dL 70 - <140 11/27/23 1141    Ferritin                  3rd Trimester (28-41w)       Test Value Reference Range Date Time    Blood Type        Antibody screen        Group B Strep OB        HGB  10.4 g/dL 12.0 - 16.0 12/13/23 1047    HCT  32.3 % 35.0 - 48.0 12/13/23 1047    HIV Combo Result OB  Non-Reactive  Non-Reactive 12/13/23 1047    Rapid HIV        Ferritin                  First Trimester & Genetic Testing (GA 0-40w)       Test Value Reference Range Date Time    MaternaT-21 (T13)        MaternaT-21 (T18)        MaternaT-21 T(T21)        VISIBILI T (T21)        VISIBILI T (T18)        QNatal T13        QNatal T18        QNatal T21        Cystic Fibrosis Screen [32]        Cystic Fibrosis Screen [165]         Cystic Fibrosis Screen [165]        Cystic Fibrosis Screen [165]        Cystic Fibrosis Screen [165]        RH d (Moku)        CVS        Nuchal Screening        NIPT [T13]        NIPT [T18]        NIPT [T21]        Carrier Screen        First Trimester Screen                  Genetic Screening (GA 0-45w)       Test Value Reference Range Date Time    AFP Tetra-Patient's HCG        AFP Tetra-Mom for HCG        AFP Tetra-Patient's UE3        AFP Tetra-Mom for UE3        AFP Tetra-Patient's JANEE        AFP Tetra-Mom for JANEE        AFP Tetra-Patient's AFP        AFP Tetra-Mom for AFP        AFP. Spina Bifida        Quad Screen ([facility])        AFP        SMA                  Legend    ^: Historical                                Assessment:     Deshawn Valera is a 28 year old  at 37w0d who presents for routine OB visit. Overall doing well.     Problem List Items Addressed This Visit    None  Visit Diagnoses       37 weeks gestation of pregnancy    -  Primary    Relevant Orders    Group B Strep PCR    FETAL NON-STRESS TEST [facility] ONLY 65461 (Completed)                Plan:     Patient Active Problem List    Diagnosis    Supervision of high risk pregnancy in third trimester    Leiomyoma of uterus affecting pregnancy, antepartum    Obesity in pregnancy     If she wants to continue metformin for entire pregnancy, then repeat 3 hr gtt while using metformin at 24-28 weeks.  If she wants to discontinue metformin, then discontinue it 1 week prior to 3 hr gtt between 24 & 28 weeks  [X] MFM  Monthly growth ultrasounds, add BPP to each growth ultrasound at 32 weeks and beyond: 36+6 EFW 2612 g ( 5 lb 12 oz); 19%.   Weekly NSTs at 34 weeks  Limit weight gain to 11-20 pounds.  Delivery at 39-40 weeks         Medication exposure during first trimester of pregnancy    Schizo affective schizophrenia (HCC)     Continue zuprasidone and co-management with her mental health team        Anxiety disorder, unspecified    Cannabis  use disorder, severe, dependence (HCC)       Doing well, +FM  Denies LOF/VB/uctx  Mode of delivery:   anticipated  SVE /-2   GBS collected  Fetal movement count given  Labor precautions provided   Cephalic on exam    IOL request sent       All of the findings and plan were discussed with the patient.  She notes understanding and agrees with the plan of care.  All questions were answered to the best of my ability at this time.      Total patient time was 15 minutes in evaluation, consultation, and coordination of care. This included face to face and non-face to face actions. The patient's questions and concerns were addressed.       Sergio Starks MD  [facility] - OBGYN        Note to patient and family   The 21st Century Cures Act makes medical notes available to patients in the interest of transparency.  However, please be advised that this is a medical document.  It is intended as [id number] communication.  It is written and medical language may contain abbreviations or verbiage that are technical and unfamiliar.  It may appear blunt or direct.  Medical documents are intended to carry relevant information, facts as evident, and the clinical opinion of the practitioner.      This note could include assistance by Dragon voice recognition. Errors in content may be related to improper recognition by the system; efforts to review and correct have been done but errors may still exist.

## 2024-02-01 NOTE — PATIENT INSTRUCTIONS
Labor Instructions    How do I know if it’s true labor?  One of the most important aspects of any pregnancy is being able to recognize the onset of labor.  Unfortunately, on occasion it can be difficult or confusing, especially if you have had one or more children.  Each labor can begin in a different way even if you have had four or five children.    If this is your first child, it is very common to have labor for an average greater than 10 hours; however, there have been rare instances for labor to be two hours or less for a first time mother. It is more important for you to know if this is your second or third baby to realize that any labor after the first is usually shorter.  There is no way to tell how long or short the labor will be. Therefore, please call us if you are unsure labor has started.       Usually, during the last six weeks of pregnancy, Navi-Christensen contractions or “false labor pains occur”.  False labor is generally not very painful though it is not always easy to tell.  You may feel contractions, cramps or uterine tightening somewhere between every 3-30 minutes but they will not continue to get stronger over time.  If you lie down, drink plenty of fluid or walk around, the contractions may go away.   False contractions are very common if you have been active on your feet for several hours.   Women frequently worry about being sent home from the hospital without having their baby (i.e. the labor stopped).  Actually, this is an unnecessary worry, for this is an infrequent occurrence.     True labor usually begins in one of two ways.  In most patients it begins with contractions of the uterus, which are irregular (but not always) in the beginning.  They are cramp-like in character and feel similar to menstrual cramps.  After a while, they become more regular, and they seem to last a little longer, and feel a little sharper.  These symptoms are very important-more important- than the timing of the  contractions.  Having regular (usually closer), longer lasting (35-70 seconds), and sharper (more painful) contractions are the common symptoms of actual labor.  The second way in which labor can begin which occurs in approximately 30% of all patients is the rupture of the bag of water.  This is a sudden gush of watery fluid, usually sufficient to run down your leg and onto the floor, or you may wet a large area of the bed if it happens at night.  There may also be tricking that is uncontrolled.  If you are unsure, please call the office.      When should I head to the hospital?  When contractions are strong and every 5 minutes for one hour.  If you have a gush of water or you think you might be leaking fluid.  If you are bleeding heavily.  If your baby is not moving around at least every 1-2 hours.  If you are worried about something.  When you think you are ready to go to the hospital.    Who do I call with concerns?  Call the office at [phone].  If the office is closed, the answering service will send a message to the physician on call.  The on call physician will be available for emergency phone calls only.          Can I eat in labor?  It is good to eat a light meal at home before going to the hospital.  Eat foods like crackers, popsicles, soup and fruit.  Avoid foods that are difficult to digest like meat, a lot of dairy products and high fat foods.  DO NOT EAT if you know you are scheduled for a  ().      What will happen when I get to the hospital?  You are going to [facility].  After 8 pm, you will need to go through the Emergency Department.  When you arrive at the hospital, you will be admitted and examined.   There are a few factors that will determine if you will be allowed to be up out of bed or if you would need to stay in bed.  The main factors are how well the baby is entering into the pelvis and if the bag of solorio is in intact or ruptured.   An intravenous (IV) solution will, with exceptions, be started.  This is extremely important especially for the baby.   Your  will be allowed in the room during your labor. During the delivery, the nurses will inform you of the hospital policy and how many coaches are allowed.  You may desire pain medication or anesthesia for pain.  You probably discussed some aspects of pain medication with us during your prenatal care.  The various options may also have been discussed in Prenatal Classes.  We utilize IV narcotics and epidural anesthesia when our patients request to have them.  If you chose to have no anesthesia, none will be administered.  A local anesthetic may be used at the time of delivery      What should I to bring to the hospital?  Maternity clothes to go home in  You can bring your own night gown to wear after giving birth, but most women prefer to wear the hospital gown because it may get soiled  Nursing Bra if you are planning to breastfeed  Clothes for your baby to go home in  Baby Car Seat.  Be sure you know how to install it correctly. Please install it before going to the hospital  Routine toiletries like toothbrush, shampoo, hairbrush and etc.   You can bring your favorite pillow, but please put a colored pillow case on it so it doesn’t get mixed up with hospital pillows    How long will I stay in the hospital?  The date you leave the hospital may vary depending on the speed of your recovery.  If you have a vaginal delivery, you will stay in the hospital 24-48 hours after your delivery as long as you aren’t having any complications.    If you have had a , you will stay in the hospital 48-72 hours as long as you aren’t having any complications.       Going Home Instructions  There are no set rules as to what you may do each day or week after you are home.  You will receive discharge instructions to help you each day.  Remember, early ambulation in the hospital is to prevent  complications.  Do not let this lull you into a false sense of strength or ability to do certain physical acts which may tire you excessively.  Please call the office within a few days after you are discharged from the hospital to schedule your post-partum visit, which is usually 4-6 weeks after delivery.    Any medications necessary will be discussed on an individual basis.  If you decide to breastfeed your baby, you should continue your prenatal vitamins.  If you do not breastfeed, simply finish the prenatal vitamins you have.      The staff at [facility] wish you a joyous and exciting birth.  If there is anything we can do to make this a better experience for you please do not hesitate to ask.        KICK COUNT INSTRUCTIONS    After 28 weeks of pregnancy, we would like for you to monitor your baby’s movement daily by doing kick counts, an easy way for you to assess your baby’s well-being.    How to count kicks:  Choose a time when the baby is active, such as after a meal.  Sit comfortably or lie on your side, and count the number of movements in an hour… you should feel 10 movements in 2 hours    The evening hours seem to be the most convenient time to do this test, although you can also do this test anytime you are concerned about the baby’s movement.    Call the office right away at [phone] if you notice any of the following:  Your baby moves less than 10 times in 2 hours while you are doing kick counts.  Your baby moves much less often than on the days before.  You have not felt your baby move all day.

## 2024-02-02 ENCOUNTER — TELEPHONE (OUTPATIENT)
Dept: OBGYN CLINIC | Facility: CLINIC | Age: 29
End: 2024-02-02

## 2024-02-02 NOTE — TELEPHONE ENCOUNTER
----- Message from Sergio Starks MD sent at 2/1/2024  5:06 PM CST -----  Request IOL at/between this GA: 39-40+0 wks  Estimated Date of Delivery: 2/22/24  Indication for IOL:  obesity   Time of appointment: PM  Cervical ripening with cytotec: yes  IOL with pitocin: yes, per protocol   OB history/complications: obesity

## 2024-02-03 LAB — GROUP B STREP BY PCR FOR PCR OVT: POSITIVE

## 2024-02-04 ENCOUNTER — ANESTHESIA (OUTPATIENT)
Dept: OBGYN UNIT | Facility: HOSPITAL | Age: 29
End: 2024-02-04
Payer: COMMERCIAL

## 2024-02-04 ENCOUNTER — ANESTHESIA EVENT (OUTPATIENT)
Dept: OBGYN UNIT | Facility: HOSPITAL | Age: 29
End: 2024-02-04
Payer: COMMERCIAL

## 2024-02-04 ENCOUNTER — HOSPITAL ENCOUNTER (INPATIENT)
Facility: HOSPITAL | Age: 29
LOS: 2 days | Discharge: HOME | End: 2024-02-06
Attending: STUDENT IN AN ORGANIZED HEALTH CARE EDUCATION/TRAINING PROGRAM | Admitting: STUDENT IN AN ORGANIZED HEALTH CARE EDUCATION/TRAINING PROGRAM
Payer: COMMERCIAL

## 2024-02-04 PROBLEM — Z34.90 PREGNANCY (HCC): Status: ACTIVE | Noted: 2024-02-04

## 2024-02-04 PROBLEM — O42.90 LEAKAGE, AMNIOTIC FLUID: Status: ACTIVE | Noted: 2024-02-04

## 2024-02-04 PROBLEM — Z34.90 PREGNANCY: Status: ACTIVE | Noted: 2024-02-04

## 2024-02-04 PROBLEM — O42.90 LEAKAGE, AMNIOTIC FLUID (HCC): Status: ACTIVE | Noted: 2024-02-04

## 2024-02-04 LAB
ALBUMIN SERPL-MCNC: 3.1 G/DL (ref 3.4–5)
ALBUMIN/GLOB SERPL: 0.6 {RATIO} (ref 1–2)
ALP LIVER SERPL-CCNC: 139 U/L
ALT SERPL-CCNC: 26 U/L
ANION GAP SERPL CALC-SCNC: 6 MMOL/L (ref 0–18)
ANTIBODY SCREEN: NEGATIVE
AST SERPL-CCNC: 30 U/L (ref 15–37)
BASOPHILS # BLD AUTO: 0.03 X10(3) UL (ref 0–0.2)
BASOPHILS NFR BLD AUTO: 0.4 %
BILIRUB SERPL-MCNC: 0.7 MG/DL (ref 0.1–2)
BUN BLD-MCNC: 9 MG/DL (ref 9–23)
CALCIUM BLD-MCNC: 10.3 MG/DL (ref 8.5–10.1)
CHLORIDE SERPL-SCNC: 109 MMOL/L (ref 98–112)
CO2 SERPL-SCNC: 22 MMOL/L (ref 21–32)
CREAT BLD-MCNC: 0.89 MG/DL
CREAT UR-SCNC: 133 MG/DL
EGFRCR SERPLBLD CKD-EPI 2021: 91 ML/MIN/1.73M2 (ref 60–?)
EOSINOPHIL # BLD AUTO: 0.13 X10(3) UL (ref 0–0.7)
EOSINOPHIL NFR BLD AUTO: 1.6 %
ERYTHROCYTE [DISTWIDTH] IN BLOOD BY AUTOMATED COUNT: 14.1 %
GLOBULIN PLAS-MCNC: 5.3 G/DL (ref 2.8–4.4)
GLUCOSE BLD-MCNC: 85 MG/DL (ref 70–99)
HCT VFR BLD AUTO: 40.9 %
HGB BLD-MCNC: 13 G/DL
IMM GRANULOCYTES # BLD AUTO: 0.04 X10(3) UL (ref 0–1)
IMM GRANULOCYTES NFR BLD: 0.5 %
LYMPHOCYTES # BLD AUTO: 1.65 X10(3) UL (ref 1–4)
LYMPHOCYTES NFR BLD AUTO: 20.2 %
MCH RBC QN AUTO: 27.3 PG (ref 26–34)
MCHC RBC AUTO-ENTMCNC: 31.8 G/DL (ref 31–37)
MCV RBC AUTO: 85.7 FL
MONOCYTES # BLD AUTO: 0.89 X10(3) UL (ref 0.1–1)
MONOCYTES NFR BLD AUTO: 10.9 %
MORPHOLOGY: NORMAL
NEUTROPHILS # BLD AUTO: 5.43 X10 (3) UL (ref 1.5–7.7)
NEUTROPHILS # BLD AUTO: 5.43 X10(3) UL (ref 1.5–7.7)
NEUTROPHILS NFR BLD AUTO: 66.4 %
OSMOLALITY SERPL CALC.SUM OF ELEC: 282 MOSM/KG (ref 275–295)
PLATELET # BLD AUTO: 255 10(3)UL (ref 150–450)
POTASSIUM SERPL-SCNC: 4.3 MMOL/L (ref 3.5–5.1)
PROT SERPL-MCNC: 8.4 G/DL (ref 6.4–8.2)
PROT UR-MCNC: 30.9 MG/DL
PROT/CREAT UR-RTO: 0.23
RBC # BLD AUTO: 4.77 X10(6)UL
RH BLOOD TYPE: POSITIVE
SODIUM SERPL-SCNC: 137 MMOL/L (ref 136–145)
T PALLIDUM AB SER QL IA: NONREACTIVE
URATE SERPL-MCNC: 6.4 MG/DL
WBC # BLD AUTO: 8.2 X10(3) UL (ref 4–11)

## 2024-02-04 PROCEDURE — 0KQM0ZZ REPAIR PERINEUM MUSCLE, OPEN APPROACH: ICD-10-PCS | Performed by: OBSTETRICS & GYNECOLOGY

## 2024-02-04 PROCEDURE — 59410 OBSTETRICAL CARE: CPT | Performed by: OBSTETRICS & GYNECOLOGY

## 2024-02-04 PROCEDURE — 10H07YZ INSERTION OF OTHER DEVICE INTO PRODUCTS OF CONCEPTION, VIA NATURAL OR ARTIFICIAL OPENING: ICD-10-PCS | Performed by: OBSTETRICS & GYNECOLOGY

## 2024-02-04 RX ORDER — IBUPROFEN 600 MG/1
600 TABLET ORAL EVERY 6 HOURS
Status: DISCONTINUED | OUTPATIENT
Start: 2024-02-04 | End: 2024-02-06

## 2024-02-04 RX ORDER — SIMETHICONE 80 MG
80 TABLET,CHEWABLE ORAL 3 TIMES DAILY PRN
Status: DISCONTINUED | OUTPATIENT
Start: 2024-02-04 | End: 2024-02-06

## 2024-02-04 RX ORDER — NIFEDIPINE 30 MG/1
30 TABLET, EXTENDED RELEASE ORAL DAILY
Status: DISCONTINUED | OUTPATIENT
Start: 2024-02-04 | End: 2024-02-06

## 2024-02-04 RX ORDER — SODIUM CHLORIDE, SODIUM LACTATE, POTASSIUM CHLORIDE, CALCIUM CHLORIDE 600; 310; 30; 20 MG/100ML; MG/100ML; MG/100ML; MG/100ML
INJECTION, SOLUTION INTRAVENOUS CONTINUOUS
Status: DISCONTINUED | OUTPATIENT
Start: 2024-02-04 | End: 2024-02-04

## 2024-02-04 RX ORDER — ACETAMINOPHEN 500 MG
500 TABLET ORAL EVERY 6 HOURS PRN
Status: DISCONTINUED | OUTPATIENT
Start: 2024-02-04 | End: 2024-02-04

## 2024-02-04 RX ORDER — BUPIVACAINE HCL/0.9 % NACL/PF 0.25 %
5 PLASTIC BAG, INJECTION (ML) EPIDURAL AS NEEDED
Status: DISCONTINUED | OUTPATIENT
Start: 2024-02-04 | End: 2024-02-04

## 2024-02-04 RX ORDER — DOCUSATE SODIUM 100 MG/1
100 CAPSULE, LIQUID FILLED ORAL
Status: DISCONTINUED | OUTPATIENT
Start: 2024-02-04 | End: 2024-02-06

## 2024-02-04 RX ORDER — ACETAMINOPHEN 500 MG
1000 TABLET ORAL EVERY 6 HOURS PRN
Status: DISCONTINUED | OUTPATIENT
Start: 2024-02-04 | End: 2024-02-06

## 2024-02-04 RX ORDER — CITRIC ACID/SODIUM CITRATE 334-500MG
30 SOLUTION, ORAL ORAL AS NEEDED
Status: DISCONTINUED | OUTPATIENT
Start: 2024-02-04 | End: 2024-02-04

## 2024-02-04 RX ORDER — ACETAMINOPHEN 500 MG
1000 TABLET ORAL EVERY 6 HOURS PRN
Status: DISCONTINUED | OUTPATIENT
Start: 2024-02-04 | End: 2024-02-04

## 2024-02-04 RX ORDER — ONDANSETRON 2 MG/ML
4 INJECTION INTRAMUSCULAR; INTRAVENOUS EVERY 6 HOURS PRN
Status: DISCONTINUED | OUTPATIENT
Start: 2024-02-04 | End: 2024-02-04

## 2024-02-04 RX ORDER — TERBUTALINE SULFATE 1 MG/ML
0.25 INJECTION, SOLUTION SUBCUTANEOUS AS NEEDED
Status: DISCONTINUED | OUTPATIENT
Start: 2024-02-04 | End: 2024-02-04

## 2024-02-04 RX ORDER — BISACODYL 10 MG
10 SUPPOSITORY, RECTAL RECTAL ONCE AS NEEDED
Status: DISCONTINUED | OUTPATIENT
Start: 2024-02-04 | End: 2024-02-06

## 2024-02-04 RX ORDER — DEXTROSE, SODIUM CHLORIDE, SODIUM LACTATE, POTASSIUM CHLORIDE, AND CALCIUM CHLORIDE 5; .6; .31; .03; .02 G/100ML; G/100ML; G/100ML; G/100ML; G/100ML
INJECTION, SOLUTION INTRAVENOUS AS NEEDED
Status: DISCONTINUED | OUTPATIENT
Start: 2024-02-04 | End: 2024-02-04

## 2024-02-04 RX ORDER — IBUPROFEN 600 MG/1
600 TABLET ORAL ONCE AS NEEDED
Status: DISCONTINUED | OUTPATIENT
Start: 2024-02-04 | End: 2024-02-04

## 2024-02-04 RX ORDER — ACETAMINOPHEN 500 MG
500 TABLET ORAL EVERY 6 HOURS PRN
Status: DISCONTINUED | OUTPATIENT
Start: 2024-02-04 | End: 2024-02-06

## 2024-02-04 RX ORDER — NALBUPHINE HYDROCHLORIDE 10 MG/ML
2.5 INJECTION, SOLUTION INTRAMUSCULAR; INTRAVENOUS; SUBCUTANEOUS
Status: DISCONTINUED | OUTPATIENT
Start: 2024-02-04 | End: 2024-02-04

## 2024-02-04 RX ORDER — ZIPRASIDONE HYDROCHLORIDE 20 MG/1
20 CAPSULE ORAL 2 TIMES DAILY WITH MEALS
Status: DISCONTINUED | OUTPATIENT
Start: 2024-02-05 | End: 2024-02-06

## 2024-02-04 NOTE — ANESTHESIA PREPROCEDURE EVALUATION
PRE-OP EVALUATION    Patient Name: Deshawn Valera    Admit Diagnosis: pregnancy  Pregnancy    Pre-op Diagnosis: * No pre-op diagnosis entered *        Anesthesia Procedure: LABOR ANALGESIA    * No surgeons found in log *    Pre-op vitals reviewed.  Temp: 98.7 °F (37.1 °C)  Pulse: 105  Resp: 16  BP: 127/87  SpO2: 99 %  Body mass index is 40.85 kg/m².    Current medications reviewed.  Hospital Medications:  • lactated ringers infusion   Intravenous Continuous   • dextrose in lactated ringers 5% infusion   Intravenous PRN   • lactated ringers IV bolus 500 mL  500 mL Intravenous PRN   • acetaminophen (Tylenol Extra Strength) tab 500 mg  500 mg Oral Q6H PRN   • acetaminophen (Tylenol Extra Strength) tab 1,000 mg  1,000 mg Oral Q6H PRN   • ibuprofen (Motrin) tab 600 mg  600 mg Oral Once PRN   • ondansetron (Zofran) 4 MG/2ML injection 4 mg  4 mg Intravenous Q6H PRN   • oxyTOCIN in sodium chloride 0.9% (Pitocin) 30 Units/500mL infusion premix  62.5-900 francisco-units/min Intravenous Continuous   • terbutaline (Brethine) 1 MG/ML injection 0.25 mg  0.25 mg Subcutaneous PRN   • sodium citrate-citric acid (Bicitra) 500-334 MG/5ML oral solution 30 mL  30 mL Oral PRN   • [COMPLETED] ampicillin (Omnipen) 2 g in sodium chloride 0.9% 100 mL IVPB-MBP  2 g Intravenous Once    Followed by   • ampicillin (Omnipen) 1 g in sodium chloride 0.9% 100 mL IVPB-MBP  1 g Intravenous Q4H   • oxyTOCIN in sodium chloride 0.9% (Pitocin) 30 Units/500mL infusion premix  0.5-20 francisco-units/min Intravenous Continuous   • [COMPLETED] lactated ringers IV bolus 1,000 mL  1,000 mL Intravenous Once   • fentaNYL-bupivacaine 2 mcg/mL-0.125% in sodium chloride 0.9% 100 mL EPIDURAL infusion premix  12 mL/hr Epidural Continuous   • [COMPLETED] fentaNYL (Sublimaze) 50 mcg/mL injection 100 mcg  100 mcg Epidural Once   • EPHEDrine (PF) 25 MG/5 ML injection 5 mg  5 mg Intravenous PRN   • nalbuphine (Nubain) 10 mg/mL injection 2.5 mg  2.5 mg Intravenous Q15 Min  PRN       Outpatient Medications:     Medications Prior to Admission   Medication Sig Dispense Refill Last Dose   • ziprasidone 20 MG Oral Cap Take 1 capsule (20 mg total) by mouth 2 (two) times daily with meals. 60 capsule 2 2/3/2024   • METFORMIN 500 MG Oral Tab TAKE 1 TABLET(500 MG) BY MOUTH TWICE DAILY WITH MEALS 180 tablet 0 2/3/2024   • prenatal vitamin with DHA 27-0.8-228 MG Oral Cap Take 1 capsule by mouth daily.   2/3/2024   • [] Ferrous Sulfate (IRON OR) Take 1 tablet by mouth daily.          Allergies: Algae extract, Other, Pollen, and Seasonal      Anesthesia Evaluation        Anesthetic Complications           GI/Hepatic/Renal    Negative GI/hepatic/renal ROS.                             Cardiovascular            MET: >4    (+) obesity                                       Endo/Other    Negative endo/other ROS.                              Pulmonary    Negative pulmonary ROS.                       Neuro/Psych      (+) depression                              History reviewed. No pertinent surgical history.  Social History     Socioeconomic History   • Marital status: Single   Tobacco Use   • Smoking status: Never   • Smokeless tobacco: Never   Vaping Use   • Vaping Use: Never used   Substance and Sexual Activity   • Alcohol use: Not Currently     Comment: occasional   • Drug use: Not Currently     Types: Cannabis     Comment: occasional     History   Drug Use Unknown     Comment: occasional     Available pre-op labs reviewed.  Lab Results   Component Value Date    WBC 8.2 2024    RBC 4.77 2024    HGB 13.0 2024    HCT 40.9 2024    MCV 85.7 2024    MCH 27.3 2024    MCHC 31.8 2024    RDW 14.1 2024    .0 2024               Airway      Mallampati: III  Mouth opening: 3 FB  TM distance: 4 - 6 cm  Neck ROM: full Cardiovascular    Cardiovascular exam normal.         Dental             Pulmonary    Pulmonary exam normal.                 Other  findings          ASA: 3   Plan: epidural  NPO status verified and           Plan/risks discussed with: patient              Present on Admission:  **None**

## 2024-02-04 NOTE — ANESTHESIA PROCEDURE NOTES
Labor Analgesia    Date/Time: 2/4/2024 1:15 PM    Performed by: Bob Escalante MD  Authorized by: Bob Escalante MD      General Information and Staff    Start Time:  2/4/2024 1:15 PM  End Time:  2/4/2024 1:25 PM  Anesthesiologist:  Bob Escalante MD  Performed by:  Anesthesiologist  Patient Location:  OB  Site Identification: surface landmarks    Reason for Block: labor epidural    Preanesthetic Checklist: patient identified, IV checked, risks and benefits discussed, monitors and equipment checked, pre-op evaluation, timeout performed, IV bolus, anesthesia consent and sterile technique used      Procedure Details    Patient Position:  Sitting  Prep: ChloraPrep    Monitoring:  Heart rate and continuous pulse ox  Approach:  Midline    Epidural Needle    Injection Technique:  JOVITA air  Needle Type:  Tuohy  Needle Gauge:  17 G  Needle Length:  3.375 in  Location:  L3-4    Spinal Needle      Catheter    Catheter Type:  End hole  Catheter Size:  19 G  Test Dose:  Negative    Assessment    Sensory Level:  T10    Additional Comments       First attempt successful.  No resistance, pain or parasthesias on injection. No aspiration of blood or CSF.

## 2024-02-04 NOTE — PROGRESS NOTES
Pt is a 28 year old female admitted to [facility].     Chief Complaint   Patient presents with    R/o Rom      Pt is  37w3d intra-uterine pregnancy.  History obtained, consents signed. Oriented to room, staff, and plan of care.  Updated  orders received to admit pt in labor, IV hydration, pitocin for Augmentation, Antibiotics for GBS positive and epidural for pain management, report given to Abigail MATSON

## 2024-02-04 NOTE — PROGRESS NOTES
Patient is comfortable with epidural. Pitocin was off for the past hour - repetitive late decelerations resolved without pitocin. Cx: 3-4/80/-3 , no cervical change for over 2 hours, will restart pitocin , minimal change since admission.

## 2024-02-04 NOTE — H&P
[facility]  History & Physical    Deshawn Valera Patient Status:  Inpatient    10/15/1995 MRN [id number]   Location [facility] LABOR & DELIVERY Attending Sergio Starks MD   Hosp Day # 0 PCP Ruth Nash MD     SUBJECTIVE:    Deshawn Valera is a 28 year old  female with EDC 24 at 37 and 3/7 weeks gestation who is being admitted for labor management.  Her current obstetrical history is significant for  insuline resistance without diagnosis of DM, obesity.  Schizoaffective schizophrenia disorder Patient reports leaking of clear since last night  .   Fetal Movement: normal.     Obstetric History:   OB History    Para Term  AB Living   1             SAB IAB Ectopic Multiple Live Births                  # Outcome Date GA Lbr Rudy/2nd Weight Sex Delivery Anes PTL Lv   1 Current              Past Medical History:   Past Medical History:   Diagnosis Date    Anxiety     Depression     Hyperlipidemia     MDD (major depressive disorder), recurrent, severe, with psychosis ([facility]) 10/28/2022    Morbid obesity with BMI of 40.0-44.9, adult ([facility]) 2017    [X] MFM US  Monthly growth ultrasounds starting at 28 weeks, add BPP to each growth ultrasound at 32 weeks and beyond  Weekly NSTs at 34 weeks  Limit weight gain to 11-20 pounds.  Delivery at 39-40 weeks        Nonepileptic episode (HCC)     Obesity in pregnancy 2023    Psychosis ([facility]) 2021    Schizo affective schizophrenia ([facility])     Shaking     Suicidal behavior with attempted self-injury ([facility])      Past Social History: History reviewed. No pertinent surgical history.  Family History:   Family History   Problem Relation Age of Onset    Schizophrenia Father     Schizophrenia Mother     Schizophrenia Paternal Grandmother      Social History:   Social History     Tobacco Use    Smoking status: Never    Smokeless tobacco: Never   Substance Use Topics    Alcohol use: Not Currently     Comment: occasional        Home Meds:   Medications Prior to Admission   Medication Sig Dispense Refill Last Dose    ziprasidone 20 MG Oral Cap Take 1 capsule (20 mg total) by mouth 2 (two) times daily with meals. 60 capsule 2 2/3/2024    METFORMIN 500 MG Oral Tab TAKE 1 TABLET(500 MG) BY MOUTH TWICE DAILY WITH MEALS 180 tablet 0 2/3/2024    prenatal vitamin with DHA 27-0.8-228 MG Oral Cap Take 1 capsule by mouth daily.   2/3/2024    [] Ferrous Sulfate (IRON OR) Take 1 tablet by mouth daily.        Allergies:   Allergies   Allergen Reactions    Algae Extract HIVES    Other HIVES     Seaweed     Pollen OTHER (SEE COMMENTS)     \"stuffy nose\"    Seasonal ITCHING       OBJECTIVE:    Temp:  [99 °F (37.2 °C)] 99 °F (37.2 °C)  Pulse:  [89] 89  Resp:  [16] 16  BP: (148)/(78) 148/78    Lungs:   clear to auscultation bilaterally   Heart:   regular rate and rhythm, S1, S2 normal, no murmur, click, rub or gallop   Abdomen: FHT present   Fetal Surveillance:  145 BPM   Fetal heart variability: moderate      Cervix: dilation 1-2 cm     Lab Review:  O, Rh+, Rubella-immune, Hepatitis B surface antigen non-reactive, GBS positive         ASSESSMENT:    37 and 3/7 weeks gestation.  SROM  Obstetrical history significant for GBS colonizer, obesity.insuline resistance without diagnosis of DM, obesity.  Schizoaffective schizophrenia disorder    PLAN:    Risks, benefits, alternatives and possible complications have been discussed in detail with the patient.  Pre-admission, admission, and post admission procedures and expectations were discussed in detail.  All questions answered, all appropriate consents will be signed at the Hospital. Admission is planned for today.   Intervention: IV Pitocin augmentation.continue metformin and ziprazidone    Annika Quinn DO  2024  11:10 AM

## 2024-02-05 PROBLEM — O26.893 RH NEGATIVE STATUS DURING PREGNANCY IN THIRD TRIMESTER (HCC): Status: ACTIVE | Noted: 2024-02-05

## 2024-02-05 PROBLEM — Z67.91 RH NEGATIVE STATUS DURING PREGNANCY IN THIRD TRIMESTER: Status: ACTIVE | Noted: 2024-02-05

## 2024-02-05 PROBLEM — O13.9 GESTATIONAL HYPERTENSION: Status: ACTIVE | Noted: 2024-02-05

## 2024-02-05 PROBLEM — Z67.91 RH NEGATIVE STATUS DURING PREGNANCY IN THIRD TRIMESTER (HCC): Status: ACTIVE | Noted: 2024-02-05

## 2024-02-05 PROBLEM — O13.9 GESTATIONAL HYPERTENSION (HCC): Status: ACTIVE | Noted: 2024-02-05

## 2024-02-05 PROBLEM — O26.893 RH NEGATIVE STATUS DURING PREGNANCY IN THIRD TRIMESTER: Status: ACTIVE | Noted: 2024-02-05

## 2024-02-05 LAB
ATRIAL RATE: 125 BPM
BASOPHILS # BLD AUTO: 0.03 X10(3) UL (ref 0–0.2)
BASOPHILS NFR BLD AUTO: 0.3 %
EOSINOPHIL # BLD AUTO: 0.12 X10(3) UL (ref 0–0.7)
EOSINOPHIL NFR BLD AUTO: 1 %
ERYTHROCYTE [DISTWIDTH] IN BLOOD BY AUTOMATED COUNT: 13.8 %
HCT VFR BLD AUTO: 33.1 %
HGB BLD-MCNC: 10.6 G/DL
IMM GRANULOCYTES # BLD AUTO: 0.05 X10(3) UL (ref 0–1)
IMM GRANULOCYTES NFR BLD: 0.4 %
LYMPHOCYTES # BLD AUTO: 2.02 X10(3) UL (ref 1–4)
LYMPHOCYTES NFR BLD AUTO: 17.4 %
MCH RBC QN AUTO: 27 PG (ref 26–34)
MCHC RBC AUTO-ENTMCNC: 32 G/DL (ref 31–37)
MCV RBC AUTO: 84.2 FL
MONOCYTES # BLD AUTO: 1.04 X10(3) UL (ref 0.1–1)
MONOCYTES NFR BLD AUTO: 9 %
NEUTROPHILS # BLD AUTO: 8.32 X10 (3) UL (ref 1.5–7.7)
NEUTROPHILS # BLD AUTO: 8.32 X10(3) UL (ref 1.5–7.7)
NEUTROPHILS NFR BLD AUTO: 71.9 %
P AXIS: 46 DEGREES
P-R INTERVAL: 136 MS
PLATELET # BLD AUTO: 260 10(3)UL (ref 150–450)
Q-T INTERVAL: 308 MS
QRS DURATION: 78 MS
QTC CALCULATION (BEZET): 444 MS
R AXIS: 50 DEGREES
RBC # BLD AUTO: 3.93 X10(6)UL
T AXIS: -5 DEGREES
VENTRICULAR RATE: 125 BPM
WBC # BLD AUTO: 11.6 X10(3) UL (ref 4–11)

## 2024-02-05 NOTE — PROGRESS NOTES
[facility]  Obstetrics and Gynecology    OB/GYN: Postpartum Progress Note     SUBJECTIVE:  Patient is a 28 year old  female who is s/p . She is PPD# 1.   Doing well.  Denies fever, chills, N, V, chest pain and SOB. Bleeding has been stable. Voiding without difficulty.  Passing flatus.  denies Bm.  Tolerating general diet. Ambulating without difficulty. Denies N, V, Ha, vision changes and RUQ/Epigastric pain.       OBJECTIVE:  Vitals:    24 2245 24 2310 24 0336 24 0751   BP: 142/88 (!) 148/93 135/81 122/64   Pulse: 120 119 97 86   Resp:  18  18   Temp:  99.1 °F (37.3 °C)  98.3 °F (36.8 °C)   TempSrc:  Axillary  Oral   SpO2:       Weight:       Height:           Physical Exam:    General:    alert, appears stated age, cooperative, and no distress   Lochia:  appropriate   Uterine Fundus:   firm below umbilicus   Perineum:  healing well, no significant drainage, no dehiscence, no significant erythema    DVT Evaluation:  No evidence of DVT seen on physical exam.  Negative Ludwig's sign.  No cords or calf tenderness.  No significant calf/ankle edema.     Urinary output is adequate.   I/O last 3 completed shifts:  In: -   Out: 900 [Urine:800; Blood:100]      Labs:  Recent Labs   Lab 24  0758   RBC 3.93   HGB 10.6*   HCT 33.1*   MCV 84.2   MCH 27.0   MCHC 32.0   RDW 13.8   NEPRELIM 8.32*   WBC 11.6*   .0       ASSESSMENT/PLAN:  Patient is a 28 year old  female who is s/p  PPD# 1.     Doing well   Continue routine postpartum care  Vitals per routine   Encourage ambulation   gHTN, no signs or symptoms of pre eclampsia. Continue BP monitoring. Continue Nifedipine 30 mg daily, started . Continue to monitor for signs or symptoms of pre eclampsia. Precautions provided.   Plan for discharge to home tomorrow   Follow up in 3-4 days for BP check in office     Yvonne Angeles MD   [facility] - OBGYN      Note to patient and family   The  [employer] Cures Act makes  medical notes available to patients in the interest of transparency.  However, please be advised that this is a medical document.  It is intended as naoc-js-cics communication.  It is written and medical language may contain abbreviations or verbiage that are technical and unfamiliar.  It may appear blunt or direct.  Medical documents are intended to carry relevant information, facts as evident, and the clinical opinion of the practitioner.

## 2024-02-05 NOTE — PLAN OF CARE
Problem: Patient/Family Goals  Goal: Patient/Family Long Term Goal  Description: Patient's Long Term Goal: Have a healthy and safe delivery    Interventions:  -   - See additional Care Plan goals for specific interventions  Outcome: Progressing  Goal: Patient/Family Short Term Goal  Description: Patient's Short Term Goal: Adequate pain relief    Interventions:   -   - See additional Care Plan goals for specific interventions  Outcome: Progressing     Problem: BIRTH - VAGINAL/ SECTION  Goal: Fetal and maternal status remain reassuring during the birth process  Description: INTERVENTIONS:  - Monitor vital signs  - Monitor fetal heart rate  - Monitor uterine activity  - Monitor labor progression (vaginal delivery)  - DVT prophylaxis (C/S delivery)  - Surgical antibiotic prophylaxis (C/S delivery)  Outcome: Progressing     Problem: PAIN - ADULT  Goal: Verbalizes/displays adequate comfort level or patient's stated pain goal  Description: INTERVENTIONS:  - Encourage pt to monitor pain and request assistance  - Assess pain using appropriate pain scale  - Administer analgesics based on type and severity of pain and evaluate response  - Implement non-pharmacological measures as appropriate and evaluate response  - Consider cultural and social influences on pain and pain management  - Manage/alleviate anxiety  - Utilize distraction and/or relaxation techniques  - Monitor for opioid side effects  - Notify MD/LIP if interventions unsuccessful or patient reports new pain  - Anticipate increased pain with activity and pre-medicate as appropriate  Outcome: Progressing     Problem: ANXIETY  Goal: Will report anxiety at manageable levels  Description: INTERVENTIONS:  - Administer medication as ordered  - Teach and rehearse alternative coping skills  - Provide emotional support with 1:1 interaction with staff  Outcome: Progressing     Problem: CARDIOVASCULAR - ADULT  Goal: Maintains optimal cardiac output and hemodynamic  stability  Description: INTERVENTIONS:  - Monitor vital signs, rhythm, and trends  - Monitor for bleeding, hypotension and signs of decreased cardiac output  - Evaluate effectiveness of vasoactive medications to optimize hemodynamic stability  - Monitor arterial and/or venous puncture sites for bleeding and/or hematoma  - Assess quality of pulses, skin color and temperature  - Assess for signs of decreased coronary artery perfusion - ex. Angina  - Evaluate fluid balance, assess for edema, trend weights  Outcome: Progressing  Goal: Absence of cardiac arrhythmias or at baseline  Description: INTERVENTIONS:  - Continuous cardiac monitoring, monitor vital signs, obtain 12 lead EKG if indicated  - Evaluate effectiveness of antiarrhythmic and heart rate control medications as ordered  - Initiate emergency measures for life threatening arrhythmias  - Monitor electrolytes and administer replacement therapy as ordered  Outcome: Progressing

## 2024-02-05 NOTE — L&D DELIVERY NOTE
Soto, Girl [[id number]]      Labor Events     labor?: No   steroids?: None  Antibiotics received during labor?: Yes  Antibiotics (enter # doses in comment): ampicillin  Rupture date/time: 2024 0500     Rupture type: SROM  Fluid color: Clear  Labor type: Spontaneous Onset of Labor  Augmentation: Oxytocin  Indications for augmentation: Ineffective Contraction Pattern  Intrapartum & labor complications: Group B beta strep +, Late decelerations       Labor Event Times    Labor onset date/time: 2024 0600  Dilation complete date/time: 2024       [place] Presentation    Presentation: Vertex  Position: Right Occiput Anterior       Operative Delivery    Operative Vaginal Delivery: No                      Shoulder Dystocia    Shoulder Dystocia: No             Anesthesia    Method: Epidural               Delivery      Delivery date/time:  24 20:59:10   Delivery type: Normal spontaneous vaginal delivery    Details:            Delivery Providers    Delivering Clinician: Annika Quinn DO   Delivery personnel:  Provider Role   Beena Parker RN Baby Nurse   Joby Rogel RN Delivery Nurse             Cord    Vessels: 3 Vessels  Complications: None  Timed cord clamping: Yes  Time in sec: 30  Cord blood disposition: to lab        Measurements      Weight: 2580 g 5 lb 11 oz Length: 47 cm                       Placenta    Date/time: 2024  Removal: Spontaneous  Appearance: Intact  Disposition: held for future pathology       Apgars    Living status: Living   Apgar Scoring Key:    0 1 2    Skin color Blue or pale Acrocyanotic Completely pink    Heart rate Absent <100 bpm >100 bpm    Reflex irritability No response Grimace Cry or active withdrawal    Muscle tone Limp Some flexion Active motion    Respiratory effort Absent Weak cry; hypoventilation Good, crying              1 Minute:  5 Minute:  10 Minute:  15 Minute:  20 Minute:      Skin color:        Heart  rate:        Reflex irritablity:        Muscle tone:        Respiratory effort:        Total:                Skin to Skin    No data filed       Vaginal Count    Initial count RN: Joby Rogel RN  Initial count Tech: Maliha Sood    Initial counts 11   0    Final counts        Final count RN: Joby Rogel RN  Final count MD: Annika Quinn,        Delivery (Maternal)    Episiotomy: None  Perineal lacerations: 2nd Repaired?: Yes     Vaginal laceration?: No      Cervical laceration?: No    Clitoral laceration?: No    Estimated blood loss (mL): 100            28 y.o.  at 37w3d with  female infant, APGARs 8/9, weight [id number]. Body and shoulders easily delivered, bulb suctioned. Cord clamped x2 and cut after 30 seconds. Placenta delivered spontaneously, intact. 2nd degree perineal laceration.  cc

## 2024-02-05 NOTE — PLAN OF CARE
Problem: Patient/Family Goals  Goal: Patient/Family Long Term Goal  Description: Patient's Long Term Goal:     Interventions:  -   - See additional Care Plan goals for specific interventions  2024 by Joby Rogel RN  Outcome: Completed  2024 by Joby Rogel RN  Outcome: Progressing  Goal: Patient/Family Short Term Goal  Description: Patient's Short Term Goal:     Interventions:   -   - See additional Care Plan goals for specific interventions  2024 by Joby Rogel RN  Outcome: Completed  2024 by Joby Rogel RN  Outcome: Progressing     Problem: BIRTH - VAGINAL/ SECTION  Goal: Fetal and maternal status remain reassuring during the birth process  Description: INTERVENTIONS:  - Monitor vital signs  - Monitor fetal heart rate  - Monitor uterine activity  - Monitor labor progression (vaginal delivery)  - DVT prophylaxis (C/S delivery)  - Surgical antibiotic prophylaxis (C/S delivery)  2024 by Joby Rogel RN  Outcome: Completed  2024 by Joby Rogel RN  Outcome: Progressing     Problem: PAIN - ADULT  Goal: Verbalizes/displays adequate comfort level or patient's stated pain goal  Description: INTERVENTIONS:  - Encourage pt to monitor pain and request assistance  - Assess pain using appropriate pain scale  - Administer analgesics based on type and severity of pain and evaluate response  - Implement non-pharmacological measures as appropriate and evaluate response  - Consider cultural and social influences on pain and pain management  - Manage/alleviate anxiety  - Utilize distraction and/or relaxation techniques  - Monitor for opioid side effects  - Notify MD/LIP if interventions unsuccessful or patient reports new pain  - Anticipate increased pain with activity and pre-medicate as appropriate  2024 by Joby Rogel RN  Outcome: Completed  2024 by Joby Rogel RN  Outcome: Progressing     Problem: ANXIETY  Goal: Will  report anxiety at manageable levels  Description: INTERVENTIONS:  - Administer medication as ordered  - Teach and rehearse alternative coping skills  - Provide emotional support with 1:1 interaction with staff  2/4/2024 2131 by Joby Rogel RN  Outcome: Completed  2/4/2024 1931 by Joby Rgoel RN  Outcome: Progressing     Problem: CARDIOVASCULAR - ADULT  Goal: Maintains optimal cardiac output and hemodynamic stability  Description: INTERVENTIONS:  - Monitor vital signs, rhythm, and trends  - Monitor for bleeding, hypotension and signs of decreased cardiac output  - Evaluate effectiveness of vasoactive medications to optimize hemodynamic stability  - Monitor arterial and/or venous puncture sites for bleeding and/or hematoma  - Assess quality of pulses, skin color and temperature  - Assess for signs of decreased coronary artery perfusion - ex. Angina  - Evaluate fluid balance, assess for edema, trend weights  2/4/2024 2131 by Joby Rogel RN  Outcome: Completed  2/4/2024 1931 by Joby Rogel RN  Outcome: Progressing  Goal: Absence of cardiac arrhythmias or at baseline  Description: INTERVENTIONS:  - Continuous cardiac monitoring, monitor vital signs, obtain 12 lead EKG if indicated  - Evaluate effectiveness of antiarrhythmic and heart rate control medications as ordered  - Initiate emergency measures for life threatening arrhythmias  - Monitor electrolytes and administer replacement therapy as ordered  2/4/2024 2131 by Joby Rogel RN  Outcome: Completed  2/4/2024 1931 by Joby Rogel RN  Outcome: Progressing

## 2024-02-05 NOTE — PLAN OF CARE

## 2024-02-05 NOTE — PROGRESS NOTES
Patient up to bathroom with assist x 2.  Unable to void at this time. Patient transferred to mother/baby room 2209 per wheelchair in stable condition with baby and personal belongings.  Accompanied by family and staff.  Report given to mother/baby RN.

## 2024-02-05 NOTE — PROGRESS NOTES
Labor Analgesia Follow Up Note    Patient underwent epidural anesthesia for labor analgesia,    Placenta Date/Time: 2/4/2024  9:02 PM     Delivery Date/Time:: 2/4/2024   8:59 PM     /81 (BP Location: Right arm)   Pulse 97   Temp 99.1 °F (37.3 °C) (Axillary)   Resp 18   Ht 1.626 m (5' 4\")   Wt 108 kg (238 lb)   LMP  (LMP Unknown)   SpO2 93%   Breastfeeding Yes   BMI 40.85 kg/m²     Assessment:  Patient seen and no apparent anesthesia related complications.    Thank you for asking us to participate in the care of your patient.

## 2024-02-06 VITALS
RESPIRATION RATE: 16 BRPM | TEMPERATURE: 100 F | WEIGHT: 238 LBS | DIASTOLIC BLOOD PRESSURE: 67 MMHG | BODY MASS INDEX: 40.63 KG/M2 | HEIGHT: 64 IN | HEART RATE: 78 BPM | SYSTOLIC BLOOD PRESSURE: 127 MMHG | OXYGEN SATURATION: 93 %

## 2024-02-06 RX ORDER — FUROSEMIDE 20 MG/1
20 TABLET ORAL DAILY PRN
Qty: 3 TABLET | Refills: 0 | Status: SHIPPED | OUTPATIENT
Start: 2024-02-06

## 2024-02-06 RX ORDER — POTASSIUM CHLORIDE 20 MEQ/1
20 TABLET, EXTENDED RELEASE ORAL ONCE
Status: COMPLETED | OUTPATIENT
Start: 2024-02-06 | End: 2024-02-06

## 2024-02-06 RX ORDER — IBUPROFEN 600 MG/1
600 TABLET ORAL EVERY 6 HOURS
Qty: 30 TABLET | Refills: 0 | Status: SHIPPED | OUTPATIENT
Start: 2024-02-06

## 2024-02-06 RX ORDER — NIFEDIPINE 30 MG
30 TABLET, EXTENDED RELEASE ORAL DAILY
Qty: 30 TABLET | Refills: 0 | Status: SHIPPED | OUTPATIENT
Start: 2024-02-06

## 2024-02-06 RX ORDER — POTASSIUM CHLORIDE 20 MEQ/1
20 TABLET, EXTENDED RELEASE ORAL DAILY PRN
Qty: 3 TABLET | Refills: 0 | Status: SHIPPED | OUTPATIENT
Start: 2024-02-06

## 2024-02-06 RX ORDER — FUROSEMIDE 20 MG/1
20 TABLET ORAL ONCE
Status: COMPLETED | OUTPATIENT
Start: 2024-02-06 | End: 2024-02-06

## 2024-02-06 NOTE — DISCHARGE SUMMARY
[facility]    Discharge Summary    Deshawn Valera Patient Status:  Inpatient    10/15/1995 MRN [id number]   Location [facility] 2SW-J Attending Sergio Starks MD   Hosp Day # 2 PCP Ruth Nash MD     Date of Admission: 2024    Date of Discharge: 2024     Admission Diagnoses:   ***    Discharge Diagnosis:   ***    Primary OB Clinician: ***    Hospital Course:     EDC: Estimated Date of Delivery: 24    Gestational Age: 37w3d    Date of Delivery: ***    Antepartum complications: {antepartum complications:71208}    Delivered By: ***     Delivery Type:     Tubal Ligation: {obgyn tubal:38427}    Baby: Liveborn {male/female:32571},     Apgars:  1 minute:                    5 minutes:                           10 minutes:      Anesthesia: {procedures; anesthesia:2201}          Intrapartum Complications: {ob details:89249}    Laceration: {:42096}    Episiotomy: {epis incis:01266}    Placenta: {placenta delivery:53678}    Feeding Method: {Source; infant milk:}    Rh Immune Globulin Given: {YES/NO/WILD CARDS:10638}    Rubella Vaccine Given: {YES/NO/WILD CARDS:54467}        Discharge Plan:   Discharge Condition: {DISCHARGE CONDITION:}  Early Discharge:  {:86698}    Discharge medications:  Current Discharge Medication List        New Orders    Details   furosemide 20 MG Oral Tab Take 1 tablet (20 mg total) by mouth daily as needed (significant swelling).      ibuprofen 600 MG Oral Tab Take 1 tablet (600 mg total) by mouth every 6 (six) hours.      NIFEdipine ER 30 MG Oral Tablet 24 Hr Take 1 tablet (30 mg total) by mouth daily. May hold if BP is less than 140/90 (both numbers.)      potassium chloride 20 MEQ Oral Tab CR Take 1 tablet (20 mEq total) by mouth daily as needed.           Home Meds - Unchanged    Details   ziprasidone 20 MG Oral Cap Take 1 capsule (20 mg total) by mouth 2 (two) times daily with meals.      METFORMIN 500 MG Oral Tab TAKE 1 TABLET(500 MG) BY  MOUTH TWICE DAILY WITH MEALS      prenatal vitamin with DHA 27-0.8-228 MG Oral Cap Take 1 capsule by mouth daily.                   Discharge Diet: {Discharge Diet:5710}    Discharge Activity: {Discharge Activity:5711}    Follow up:      Follow-up Information       Sergio Starks MD Follow up in 6 week(s).    Specialty: OBSTETRICS & GYNECOLOGY  Why: Post Partum appointment  Contact information:  [address] [place] [address]  [phone]               Sergio Starks MD. Schedule an appointment as soon as possible for a visit in 3 day(s).    Specialty: OBSTETRICS & GYNECOLOGY  Why: BP check 3-5 days after hospital discharge  Contact information:  [address] [place] [address]  [phone]                             Follow up Labs: ***         Other Discharge Instructions:         Please check blood pressure twice daily - once in morning, once in evening, & more if feeling poorly.     If BP is 140/90 (either number) or higher, please take nifedipine 30 mg XR daily.     If feeling very poorly or BP is 160/110 or higher (either number) please just present to the emergency department at [facility].      Nothing in the vagina for 6 weeks   No strenuous activity/exercise  May shower immediately. May take bath  Keep wound(s) clean and dry. Wash daily with warm water & soap. Do not scrub. Gently pat dry. May cover with clean gauze or pad if needed.     AVOID CONSTIPATION:   -Take Miralax one capful in water or juice each morning.  You can also take each evening iif needed.  -Take Fiber supplement along with Miralax as well.  -May also take milk of magnesia or Dulcolax over the counter if needing to have a BM more urgently than Miralax is providing    -Do NOT strain for bowel movements    Please call office if:  -fever 100.4 or higher    Please proceed to the Emergency Department at [facility] for any of the following:   -vaginal bleeding soaking greater than 1 pad per  hour  -severe pelvic pain  -shortness of breath  -chest pain  -leg pain or swelling          Abimbola Smith MD  2/6/2024

## 2024-02-06 NOTE — DISCHARGE INSTRUCTIONS
Please check blood pressure twice daily - once in morning, once in evening, & more if feeling poorly.     If BP is 140/90 (either number) or higher, please take nifedipine 30 mg XR daily.     If feeling very poorly or BP is 160/110 or higher (either number) please just present to the emergency department at [facility].      Nothing in the vagina for 6 weeks   No strenuous activity/exercise  May shower immediately. May take bath  Keep wound(s) clean and dry. Wash daily with warm water & soap. Do not scrub. Gently pat dry. May cover with clean gauze or pad if needed.     AVOID CONSTIPATION:   -Take Miralax one capful in water or juice each morning.  You can also take each evening iif needed.  -Take Fiber supplement along with Miralax as well.  -May also take milk of magnesia or Dulcolax over the counter if needing to have a BM more urgently than Miralax is providing    -Do NOT strain for bowel movements    Please call office if:  -fever 100.4 or higher    Please proceed to the Emergency Department at [facility] for any of the following:   -vaginal bleeding soaking greater than 1 pad per hour  -severe pelvic pain  -shortness of breath  -chest pain  -leg pain or swelling

## 2024-02-06 NOTE — PROGRESS NOTES
[facility]  Obstetrics and Gynecology    OB/GYN: Postpartum Progress Note     SUBJECTIVE:  Patient is a 28 year old  female who is s/p . She is PPD# 2    Doing well.  Denies fever, chills, N, V, chest pain and SOB. Bleeding has been stable. Voiding without difficulty.  Passing flatus. Tolerating general diet. Ambulating without difficulty. Denies N, V, Ha, vision changes and RUQ/Epigastric pain. Patient says BP has been better & nifedipine was held. Feels she was anxious during her labor process. Legs are swollen & uncomfortable. Left hand slightly more swollen than right side.       OBJECTIVE:  Vitals:    24 2232 24 0140 24 0518 24 0747   BP: 117/66 115/65 134/79 127/67   Pulse: 85 88 86 78   Resp:    16   Temp:    99.9 °F (37.7 °C)   TempSrc:    Oral   SpO2:       Weight:       Height:           Physical Exam:    General:    alert, appears stated age, cooperative, and no distress   Lochia:  appropriate   Uterine Fundus:   firm below umbilicus       DVT Evaluation:  No evidence of DVT seen on physical exam.  Negative Ludwig's sign.  No cords or calf tenderness.  2+ BLE edema at ankles      I/O last 3 completed shifts:  In: 300 [P.O.:300]  Out: 900 [Urine:800; Blood:100]      Labs:  Recent Labs   Lab 24  0758   RBC 3.93   HGB 10.6*   HCT 33.1*   MCV 84.2   MCH 27.0   MCHC 32.0   RDW 13.8   NEPRELIM 8.32*   WBC 11.6*   .0       ASSESSMENT/PLAN:  Patient is a 28 year old  female who is s/p  PPD#2, Gestational hypertension     Doing well   Continue routine postpartum care  Vitals per routine   Encourage ambulation     Rh negative. Infant also Rh negative. No Rhogam indicated.     GHTN  -no signs or symptoms of pre eclampsia  -2/4 Nifedipine 30 mg daily - was held then. Discussed restart at home if /90 or higher.   -Continue to monitor for signs or symptoms of pre eclampsia. Precautions provided.     BLE edema  -lasix & Kdur     Disposition  desires discharge home today    Follow up in 3-5 days for BP check in office     Abimbola Smith MD   [facility] - OBGYN      Note to patient and family   The 21st Century Cures Act makes medical notes available to patients in the interest of transparency.  However, please be advised that this is a medical document.  It is intended as uwbm-yt-hjee communication.  It is written and medical language may contain abbreviations or verbiage that are technical and unfamiliar.  It may appear blunt or direct.  Medical documents are intended to carry relevant information, facts as evident, and the clinical opinion of the practitioner.

## 2024-02-06 NOTE — PROGRESS NOTES
Discharge instructions reviewed with, and copy given to, patient.  Patient and mother verbalized understanding of all instructions and denied further questions.  Patient discharged in stable condition, with infant, and mother.

## 2024-02-07 ENCOUNTER — TELEPHONE (OUTPATIENT)
Dept: OBGYN CLINIC | Facility: CLINIC | Age: 29
End: 2024-02-07

## 2024-02-07 RX ORDER — NIFEDIPINE 30 MG
TABLET, EXTENDED RELEASE ORAL
Qty: 90 TABLET | Refills: 0 | OUTPATIENT
Start: 2024-02-07

## 2024-02-07 NOTE — TELEPHONE ENCOUNTER
----- Message from Yvonne Angeles MD sent at 2/5/2024  1:12 PM CST -----  Regarding: needs BP check  PPD#1, needs BP check at 3-4 days postpartum. gHTN on Nifedipine 30 mg daily. Plan for discharge to home tomorrow.

## 2024-02-07 NOTE — TELEPHONE ENCOUNTER
Spoke to patient and discussed BP precautions. She is currently at the store and will call back to scheduled BP check.

## 2024-02-08 ENCOUNTER — TELEPHONE (OUTPATIENT)
Dept: OBGYN UNIT | Facility: HOSPITAL | Age: 29
End: 2024-02-08

## 2024-02-08 ENCOUNTER — TELEPHONE (OUTPATIENT)
Dept: OBGYN CLINIC | Facility: CLINIC | Age: 29
End: 2024-02-08

## 2024-02-08 NOTE — TELEPHONE ENCOUNTER
Called patient to assist with scheduling BP check appointment.  Patient states she needs to make transportation arrangements and will call us back later to make her appointment.  Patient aware that we would like to see her tomorrow or possibly Monday if that works better for transportation.  Patient verbalized understanding and will call back to schedule.

## 2024-02-08 NOTE — TELEPHONE ENCOUNTER
Received FMLA, DEBORA, and $25 processing fee for pt. [id number]. Pt said she needs to have paperwork turned in by 02/26/24. Did let pt know it can take up to 2-3weeks.

## 2024-02-09 ENCOUNTER — TELEPHONE (OUTPATIENT)
Dept: OBGYN CLINIC | Facility: CLINIC | Age: 29
End: 2024-02-09

## 2024-02-09 NOTE — TELEPHONE ENCOUNTER
Called and spoke with pt.  Pt. Delivery  2024 female   DONYATN  -no signs or symptoms of pre eclampsia  - Nifedipine 30 mg daily - was held then. Discussed restart at home if /90 or higher.   -Continue to monitor for signs or symptoms of pre eclampsia. Precautions provided.   BLE edema  -lasix & Kdur  given PRESCRIPTION and took x 3 days..    Spoke with pt. today and she is taking BP sporadically, encouraged her to take BP BID ,  She says BP  has been in the 120's/60-70's.  So has not started the Nifedipine  30 mg DAiILY... was told to start if only BP was 140/90 .  Denies any pre-eclampsia symptoms. No HA, epigastric pain or RUQ pain, no visual disturbances,no SOB,  but does have swelling in lower extremities, slow to improve..encouraged her to elevate legs as much as possible, drink fluids and try compression stockings, if she wants..notes some swelling hands but also noted in Progress note from hosp. pt. says has improved.    Made appointment. for BP Check for Tues. 24 at [facility] office at 1 pm per pt's request..  To call office with any questions or concerns,,,agrees to plan and verbalizes understanding.

## 2024-02-09 NOTE — TELEPHONE ENCOUNTER
Pt called to schedule her postp bp check [facility] on the 13th  after 10am.

## 2024-02-09 NOTE — TELEPHONE ENCOUNTER
Forms received and logged in my in box- patient called and states needs them ASAP but will wait for me to complete these forms for her she wants to make sure they are being handled since they are due 02/26/24

## 2024-02-12 NOTE — TELEPHONE ENCOUNTER
Please try to avoid signing forms in the corner as it is not visible when printing and forms are not accepted this way. Thank you!    Mauricio Shafer,     *The ACKNOWLEDGE button has been moved to the top right ribbon*    Please sign off on form if you agree to: FMLA   Start: 2/4/24 End: 6 weeks vaginal  (place your signature on the first page only)    -From your Inbasket, Highlight the patient and click Chart   -Double click the 2/8/24 Forms Completion telephone encounter  -Scroll down to the Media section   -Click the blue Hyperlink: FMLA MADELINE 2/12/24  -Click Acknowledge located in the top right ribbon/menu   -Drag the mouse into the blank space of the document and a + sign will appear. Left click to   electronically sign the document.     Thank you,     Dawson

## 2024-02-13 ENCOUNTER — NURSE ONLY (OUTPATIENT)
Dept: OBGYN CLINIC | Facility: CLINIC | Age: 29
End: 2024-02-13
Payer: MEDICAID

## 2024-02-13 VITALS
HEART RATE: 100 BPM | SYSTOLIC BLOOD PRESSURE: 112 MMHG | BODY MASS INDEX: 47 KG/M2 | WEIGHT: 275 LBS | DIASTOLIC BLOOD PRESSURE: 78 MMHG

## 2024-02-13 NOTE — PROGRESS NOTES
Pt. Here for BP check for GHTN...    Pt.    2024 at 37 3/7 week..  Bp'd were elevated during her L/D period..  During her PP period in hospital bp back down pretty much 120's/60-70's..  Was given PRESCRIPTION for home for  Nifedipine 30 mg daily if her bp went over or =140/90..along with 3 days of Lasix and Potassium..    Today when pt. Came in:  BP..112/78  Pulse:  100  Weight..275lb.    Pt denies any pre-eclamptic symptoms..  No headache, blurred vision, RUQ pain, epigastric pain, SOB, N/V, unusual swelling of hands or feet...Does have 2+ BLE edema..  Her BP's at home were mostly 120's/70-80's, she at one point got bp reading of 142/90 and took one table of Nifedipine 30 mg and that was it.    Return 6 week PP check  with Soni.    Reviewed all info with  today and she agrees pt. Can stay off Nifedipine.    Reviewed all pre-eclampsia symptoms with pt. And what to be cautious of and what to report to office. Any kind of consistent bp reading of 140/90 or higher or any kind of pre-eclampsia  symptoms  Pt. Verbalizes understanding of info and agrees to plan.  Advised pt. to keep legs up as much as possible to help with BLE /swelling, drink fluids and wear support hose, which opt. Says she has and will try.

## 2024-02-13 NOTE — PATIENT INSTRUCTIONS
Please call the office if you have any blood pressures greater than 140/90, have a persistent headache that is not improved with rest/fluids/food/over the counter pain medication, vision changes, pain in your right upper side, or unusual/new swelling.   Please continue to elevate legs as much as possible and try wearing support hose to help with swelling along with drinking plenty of fluids    Take BP at least daily and keep record of readings and bring to 6 week post partum check up on March 20th..    Call with any questions or concerns, congratulations again.  You are doing a great job, keep up the good work and rest as much as possible.    Take Care  [facility]

## 2024-02-14 NOTE — TELEPHONE ENCOUNTER
Forms completed and faxed to [facility] [phone]. Faxed confirmation received, [employer] message sent to pt.

## 2024-03-07 RX ORDER — NIFEDIPINE 30 MG
TABLET, EXTENDED RELEASE ORAL
Qty: 30 TABLET | Refills: 0 | OUTPATIENT
Start: 2024-03-07

## 2024-03-20 ENCOUNTER — POSTPARTUM (OUTPATIENT)
Dept: OBGYN CLINIC | Facility: CLINIC | Age: 29
End: 2024-03-20
Payer: MEDICAID

## 2024-03-20 VITALS
HEIGHT: 63 IN | WEIGHT: 279 LBS | SYSTOLIC BLOOD PRESSURE: 120 MMHG | DIASTOLIC BLOOD PRESSURE: 70 MMHG | BODY MASS INDEX: 49.43 KG/M2

## 2024-03-20 DIAGNOSIS — Z30.011 INITIATION OF ORAL CONTRACEPTION: ICD-10-CM

## 2024-03-20 RX ORDER — NORETHINDRONE ACETATE AND ETHINYL ESTRADIOL 1MG-20(21)
1 KIT ORAL DAILY
Qty: 3 EACH | Refills: 2 | Status: SHIPPED | OUTPATIENT
Start: 2024-03-20

## 2024-03-20 NOTE — PATIENT INSTRUCTIONS
Instructions for Birth Control Pill Use    The birth control pill works primarily by blocking ovulation (release of an egg).  If there is no egg to meet the sperm, pregnancy cannot occur.  The pill also works by making cervical mucous thick and unreceptive to sperm, slowing tubal function which has to move the egg down the tube to meet the sperm.    For women who follow these directions carefully, the pill is an effective reversible contraceptive currently available.    Starting birth control pills for the first time  1). Choose a backup method of birth control (such as condoms, diaphragm or foam) to use with your first pack of pills because the pill may not fully protect you from pregnancy during the first two weeks.  Keep this backup method handy and use it in case you:  Run out of pills  Forget to take your pill  Discontinue pill use  The use of condoms is ALWAYS encouraged to protect against sexually transmitted diseases, if applicable.   2). There are several ways to start taking your pills. Use one of the following approaches:  First day of period start: Start your first pack of pills on the day of your period. No back-up method is required  Sunday start: Start your first pack of pills on the first Sunday after your period begins.  This will result in your menses almost always beginning on a Tuesday or a Wednesday every 4 weeks.  You will need a backup method for 14 days.  Quick Start: Start immediately if pregnancy is excluded. You will need a back-up method for 14 days.  Quick start will cause your period to be irregular.  3). Take one pill a day until you finish the pack.   If you are using a 28-day pack, begin a new pack immediately. Skip no days between packages  If you are using a 21-day pack, stop taking pills for 1 week and then start your new pack   4). Try to associate taking your pill with something you do around the same time every day, like brushing your teeth in the morning, eating a meal or  going to bed.  Establishing a routine will make it easier for you to remember. The pill works best if you take it about the same time every day.    Continuing on the pills ~ What if……  1). If you have bleeding between periods  This is a very common side effect of birth control pills for the first 3 months.  Spotting (light bleeding between periods) can also occur if you miss a pill.  Call the doctor’s office for advice if bleeding is heavier than 1 pad an hour or the bleeding between periods last for more than 3 months  2). If you have nausea or vomiting  Nausea and vomiting may occur during the first few months of taking birth control pills.  Sometimes by changing the time of the day when you take the pill will help.  Try switching to dinner time or before bed.  If you had episodes of vomiting within 2 hours of taking your pill, please use a back-up plan for the rest of the cycle because your body may not absorbed the pill.  Contact your doctor’s office if you have persistent nausea and vomiting.  3). If you miss your period  It is not uncommon for your periods to become lighter while taking birth control pills or miss you period all together.  If you missed any pills in the pack prior to this occurring, you should take a home pregnancy test prior to starting a new pack.  4). If you forget your pills for a day or two follow the instructions below:  If you miss a pill, take the forgotten one (yesterday’s pill) as soon as you remember it and take today’s pill at the regular time.  Although you probably will not become pregnant, use a back-up method until your next period.  If you miss two pills in a row, take two pills as soon as you remember and two pills the next day.  You may have some spotting and nausea.  Please use a back-up method until your next period.  If you miss three or more pills in a row, do not take all 3 pills at once.  If you are in the third week of pills, finish the pack and skip the inactive  pills and start a new pack.  Start your backup method of birth control immediately.  You are at risk for becoming pregnant if you do not use a backup contraception.    Remember, missed pills may cause you to start spotting or bleeding for about 7 days.    5). If you experience breast soreness, mild headaches, mild edema (swelling)  These symptoms are usually mild and will improve after being on the pill for 3 or more months.  If any of these symptoms are severe or persist more than 3 months, you should contact our office.  6). If you experience mood swings or changes  Usually, after you adjust to the hormones, these symptoms will improve.  If at any time these symptoms are severe or persistent, you should contact our office.    7). If your doctor has you on continuous pills (No 7 day break after 21 days of active pills) in order to suppress your menses because of endometriosis or premenstrual syndrome, you will likely have break through bleeding.  If the spotting persists through more than 3 packs of pills, contact your doctor to confirm that you should stay on that brand of pills    8). If you need to take any other medications, including antibiotics, check with the pharmacist to see if there will be any interaction or if it will make your birth control pill less effective.      When to contact your provider  If you are having severe abdominal pain  Chest pain and shortness of breath  Severe Headaches  Blurred Vision or Vision Loss  Severe leg pain- calf or thigh    If you have additional questions or concerns, please call us at [phone]

## 2024-03-20 NOTE — TELEPHONE ENCOUNTER
Returned pt call, pt stts she had her post partum appt yesterday. Pt needs RTW form filled out. Pt requesting to RTW 04/01/24.

## 2024-03-20 NOTE — PROGRESS NOTES
Subjective:  Chief Complaint   Patient presents with    Postpartum Care     6 wk      Deshawn Vaibhav Valera presents for her 6 week post partum exam after vaginal delivery.  She denies any gastrointestinal/genitourinary complaints.  She denies any symptoms of depression.  Infant doing well.  Bottle feeding without difficulty.  No fever, vaginal discharge, or abdominal pain.  No further lochia.    Rosalia  Depression Scale (EPDS) 2  No gestational diabetes    Objective:  /70   Ht 63\"   Wt 279 lb (126.6 kg)   LMP 03/10/2024   Breastfeeding No   BMI 49.42 kg/m²   Physical Exam:  Abdomen: Soft, non-tender, non-distended, no masses.  Pelvic:    External genitalia- Normal, Bartholin's, urethra, skeins glands normal   Vagina- No vaginal lesions, no discharge   Cervix- No lesions, long/closed, no cervical motion tenderness   Uterus- Normal size, non-tender, no masses   Adnexa-  Non-tender, no masses  Perineum:  Normal  Extremities: Non-tender     Pap/HPV 2021     Assessment:  Normal post partum examination.  Doing well. OK to return to normal activities/work.    Plan:  Follow up in 9 months for routine annual gynecologic examination.    Patient desires combination OCP. 9 month prescription for loestrin with instructions and warnings.  No personal or family contraindications.  Reviewed risks including VTE and stroke.    Diagnoses and all orders for this visit:    Postpartum care following vaginal delivery (HCC)    Initiation of oral contraception  -     Norethin Ace-Eth Estrad-FE 1-20 MG-MCG Oral Tab; Take 1 tablet by mouth daily.        Return in about 9 months (around 2024) for annual well woman exam.

## 2024-03-21 NOTE — TELEPHONE ENCOUNTER
RTW forms completed and faxed to [place] [phone]. Right fax confirmation received. Called pt to inform forms completed and faxed, pt verbalized understanding.

## 2024-03-21 NOTE — TELEPHONE ENCOUNTER
Soni,     *The ACKNOWLEDGE button has been moved to the top right ribbon*    Please sign off on form if you agree to: RTW 04/01/24.  (place your signature on the first page only)    -From your Inbasket, Highlight the patient and click Chart   -Double click the 02/08/2024 Forms Completion telephone encounter  -Scroll down to the Media section   -Click the blue Hyperlink: GLADYS Samuel 03/21/24  -Click Acknowledge located in the top right ribbon/menu   -Drag the mouse into the blank space of the document and a + sign will appear. Left click to   electronically sign the document.     Thank you,    Ilsa

## 2024-05-10 ENCOUNTER — PATIENT OUTREACH (OUTPATIENT)
Dept: FAMILY MEDICINE CLINIC | Facility: CLINIC | Age: 29
End: 2024-05-10

## 2024-07-10 ENCOUNTER — OFFICE VISIT (OUTPATIENT)
Dept: FAMILY MEDICINE CLINIC | Facility: CLINIC | Age: 29
End: 2024-07-10
Payer: COMMERCIAL

## 2024-07-10 VITALS
SYSTOLIC BLOOD PRESSURE: 128 MMHG | TEMPERATURE: 96 F | HEART RATE: 88 BPM | DIASTOLIC BLOOD PRESSURE: 80 MMHG | HEIGHT: 63.78 IN | BODY MASS INDEX: 47.25 KG/M2 | OXYGEN SATURATION: 97 % | RESPIRATION RATE: 18 BRPM | WEIGHT: 273.38 LBS

## 2024-07-10 DIAGNOSIS — N93.8 DUB (DYSFUNCTIONAL UTERINE BLEEDING): ICD-10-CM

## 2024-07-10 DIAGNOSIS — Z00.00 WELL ADULT EXAM: Primary | ICD-10-CM

## 2024-07-10 DIAGNOSIS — E11.9 TYPE 2 DIABETES MELLITUS WITHOUT COMPLICATION, WITHOUT LONG-TERM CURRENT USE OF INSULIN (HCC): ICD-10-CM

## 2024-07-10 DIAGNOSIS — Z01.419 CERVICAL SMEAR, AS PART OF ROUTINE GYNECOLOGICAL EXAMINATION: ICD-10-CM

## 2024-07-10 LAB
ALBUMIN SERPL-MCNC: 3.9 G/DL (ref 3.4–5)
ALBUMIN/GLOB SERPL: 0.8 {RATIO} (ref 1–2)
ALP LIVER SERPL-CCNC: 96 U/L
ALT SERPL-CCNC: 38 U/L
ANION GAP SERPL CALC-SCNC: 6 MMOL/L (ref 0–18)
AST SERPL-CCNC: 41 U/L (ref 15–37)
BASOPHILS # BLD AUTO: 0.04 X10(3) UL (ref 0–0.2)
BASOPHILS NFR BLD AUTO: 0.7 %
BILIRUB SERPL-MCNC: 0.8 MG/DL (ref 0.1–2)
BUN BLD-MCNC: 12 MG/DL (ref 9–23)
CALCIUM BLD-MCNC: 9.6 MG/DL (ref 8.5–10.1)
CHLORIDE SERPL-SCNC: 104 MMOL/L (ref 98–112)
CHOLEST SERPL-MCNC: 212 MG/DL (ref ?–200)
CO2 SERPL-SCNC: 27 MMOL/L (ref 21–32)
CREAT BLD-MCNC: 1.17 MG/DL
CREAT UR-SCNC: 324 MG/DL
EGFRCR SERPLBLD CKD-EPI 2021: 65 ML/MIN/1.73M2 (ref 60–?)
EOSINOPHIL # BLD AUTO: 0.16 X10(3) UL (ref 0–0.7)
EOSINOPHIL NFR BLD AUTO: 2.7 %
ERYTHROCYTE [DISTWIDTH] IN BLOOD BY AUTOMATED COUNT: 13.7 %
EST. AVERAGE GLUCOSE BLD GHB EST-MCNC: 126 MG/DL (ref 68–126)
FASTING PATIENT LIPID ANSWER: YES
FASTING STATUS PATIENT QL REPORTED: YES
GLOBULIN PLAS-MCNC: 4.8 G/DL (ref 2.8–4.4)
GLUCOSE BLD-MCNC: 123 MG/DL (ref 70–99)
HBA1C MFR BLD: 6 % (ref ?–5.7)
HCT VFR BLD AUTO: 41.8 %
HDLC SERPL-MCNC: 38 MG/DL (ref 40–59)
HGB BLD-MCNC: 13.2 G/DL
IMM GRANULOCYTES # BLD AUTO: 0.01 X10(3) UL (ref 0–1)
IMM GRANULOCYTES NFR BLD: 0.2 %
LDLC SERPL CALC-MCNC: 145 MG/DL (ref ?–100)
LYMPHOCYTES # BLD AUTO: 2.2 X10(3) UL (ref 1–4)
LYMPHOCYTES NFR BLD AUTO: 37.6 %
MCH RBC QN AUTO: 26.5 PG (ref 26–34)
MCHC RBC AUTO-ENTMCNC: 31.6 G/DL (ref 31–37)
MCV RBC AUTO: 83.8 FL
MICROALBUMIN UR-MCNC: 8.83 MG/DL
MICROALBUMIN/CREAT 24H UR-RTO: 27.3 UG/MG (ref ?–30)
MONOCYTES # BLD AUTO: 0.64 X10(3) UL (ref 0.1–1)
MONOCYTES NFR BLD AUTO: 10.9 %
NEUTROPHILS # BLD AUTO: 2.8 X10 (3) UL (ref 1.5–7.7)
NEUTROPHILS # BLD AUTO: 2.8 X10(3) UL (ref 1.5–7.7)
NEUTROPHILS NFR BLD AUTO: 47.9 %
NONHDLC SERPL-MCNC: 174 MG/DL (ref ?–130)
OSMOLALITY SERPL CALC.SUM OF ELEC: 285 MOSM/KG (ref 275–295)
PLATELET # BLD AUTO: 320 10(3)UL (ref 150–450)
POTASSIUM SERPL-SCNC: 4 MMOL/L (ref 3.5–5.1)
PROT SERPL-MCNC: 8.7 G/DL (ref 6.4–8.2)
RBC # BLD AUTO: 4.99 X10(6)UL
SODIUM SERPL-SCNC: 137 MMOL/L (ref 136–145)
T4 FREE SERPL-MCNC: 1.2 NG/DL (ref 0.8–1.7)
TRIGL SERPL-MCNC: 161 MG/DL (ref 30–149)
TSI SER-ACNC: 0.99 MIU/ML (ref 0.36–3.74)
VLDLC SERPL CALC-MCNC: 30 MG/DL (ref 0–30)
WBC # BLD AUTO: 5.9 X10(3) UL (ref 4–11)

## 2024-07-10 PROCEDURE — 3079F DIAST BP 80-89 MM HG: CPT | Performed by: FAMILY MEDICINE

## 2024-07-10 PROCEDURE — 99213 OFFICE O/P EST LOW 20 MIN: CPT | Performed by: FAMILY MEDICINE

## 2024-07-10 PROCEDURE — 82570 ASSAY OF URINE CREATININE: CPT | Performed by: FAMILY MEDICINE

## 2024-07-10 PROCEDURE — 80061 LIPID PANEL: CPT | Performed by: FAMILY MEDICINE

## 2024-07-10 PROCEDURE — 87624 HPV HI-RISK TYP POOLED RSLT: CPT | Performed by: FAMILY MEDICINE

## 2024-07-10 PROCEDURE — 88175 CYTOPATH C/V AUTO FLUID REDO: CPT | Performed by: FAMILY MEDICINE

## 2024-07-10 PROCEDURE — 3074F SYST BP LT 130 MM HG: CPT | Performed by: FAMILY MEDICINE

## 2024-07-10 PROCEDURE — 82043 UR ALBUMIN QUANTITATIVE: CPT | Performed by: FAMILY MEDICINE

## 2024-07-10 PROCEDURE — 83036 HEMOGLOBIN GLYCOSYLATED A1C: CPT | Performed by: FAMILY MEDICINE

## 2024-07-10 PROCEDURE — 84439 ASSAY OF FREE THYROXINE: CPT | Performed by: FAMILY MEDICINE

## 2024-07-10 PROCEDURE — 99395 PREV VISIT EST AGE 18-39: CPT | Performed by: FAMILY MEDICINE

## 2024-07-10 PROCEDURE — 3008F BODY MASS INDEX DOCD: CPT | Performed by: FAMILY MEDICINE

## 2024-07-10 PROCEDURE — 84443 ASSAY THYROID STIM HORMONE: CPT | Performed by: FAMILY MEDICINE

## 2024-07-10 PROCEDURE — 85025 COMPLETE CBC W/AUTO DIFF WBC: CPT | Performed by: FAMILY MEDICINE

## 2024-07-10 PROCEDURE — 80053 COMPREHEN METABOLIC PANEL: CPT | Performed by: FAMILY MEDICINE

## 2024-07-10 NOTE — PROGRESS NOTES
Chief Complaint   Patient presents with    Well Adult     Pt is fasting    Care Gap Management     Foot exam  Dilated Eye exam  Microalb/Creat ratio  Pneumo  A1C  Pap smear         HPI  Pt is here for wellness and diabetic follow up. Pt is having menstrual cycle twice whilst on birth control and has history of fibroids. Pt will check with insurance for vaccination coverage    ROS  As per HPI and all other systems reviewed and are negative      Past Medical History:    Anxiety    Anxiety disorder, unspecified    Cannabis use disorder, severe, dependence (HCC)    Depression    Fibroid    Gestational hypertension (HCC)    at 37+ wk.    Hyperlipidemia    MDD (major depressive disorder), recurrent, severe, with psychosis (HCC)    Morbid obesity with BMI of 40.0-44.9, adult (HCC)    [X] MFM US  Monthly growth ultrasounds starting at 28 weeks, add BPP to each growth ultrasound at 32 weeks and beyond  Weekly NSTs at 34 weeks  Limit weight gain to 11-20 pounds.  Delivery at 39-40 weeks        Nonepileptic episode (HCC)    Obesity (BMI 30-39.9)    Psychosis (HCC)    Schizo affective schizophrenia (HCC)    Shaking    Suicidal behavior with attempted self-injury (HCC)       No past surgical history on file.    Social History     Socioeconomic History    Marital status: Single   Tobacco Use    Smoking status: Never    Smokeless tobacco: Never   Vaping Use    Vaping status: Never Used   Substance and Sexual Activity    Alcohol use: Not Currently     Comment: occasional    Drug use: Not Currently     Types: Cannabis     Comment: occasional    Sexual activity: Not Currently     Partners: Male   Other Topics Concern    Caffeine Concern Yes    Exercise No       Family History   Problem Relation Age of Onset    Schizophrenia Father     Schizophrenia Mother     Schizophrenia Paternal Grandmother         Current Outpatient Medications on File Prior to Visit   Medication Sig Dispense Refill    ziprasidone 20 MG Oral Cap Take 1 capsule  (20 mg total) by mouth 2 (two) times daily with meals. 180 capsule 2    Norethin Ace-Eth Estrad-FE 1-20 MG-MCG Oral Tab Take 1 tablet by mouth daily. 3 each 2    METFORMIN 500 MG Oral Tab TAKE 1 TABLET(500 MG) BY MOUTH TWICE DAILY WITH MEALS 180 tablet 0    prenatal vitamin with DHA 27-0.8-228 MG Oral Cap Take 1 capsule by mouth daily. (Patient not taking: Reported on 7/10/2024)       No current facility-administered medications on file prior to visit.         Objective  Vitals:    07/10/24 0756   BP: 128/80   Pulse: 88   Resp: 18   Temp: (!) 96.3 °F (35.7 °C)   SpO2: 97%   Weight: 273 lb 6.4 oz (124 kg)   Height: 5' 3.78\" (1.62 m)     Physical Exam  Constitutional:       Appearance: Normal appearance.   HEENT:      Head: Normocephalic and atraumatic.      Eyes: PERRLA no notable nystagmus     Ears: Normal on observation     Nose: Nose normal.      Mouth: Mucous membranes are moist.      Neck: No masses no bruit  Cardiovascular:      Rate and Rhythm: Normal rate and regular rhythm.   Breasts:       No nipple abnormality or discharge; no axillary or supraclavicular lymph nodes palpable; no skin changes  Pulmonary:      Effort: Pulmonary effort is normal.      Breath sounds: Normal breath sounds.   Abdominal:      General: Bowel sounds are normal.      Palpations: Abdomen is soft. There is no mass.   Pelvis:      Cervix and vagina normal without discharge or notable changes. PAP completed and chaperoned by Otilio  Musculoskeletal:         General: Normal range of motion.      Cervical back: Normal range of motion.   Skin:     General: Skin is warm and dry.   Neurological:      General: No focal deficit present.      Mental Status: She is alert and oriented to person, place, and time.   Psychiatric:         Mood and Affect: Mood normal.         Thought Content: Thought content normal.   Bilateral barefoot skin diabetic exam is normal, visualized feet and the appearance is normal.  Bilateral monofilament/sensation of  both feet is normal.  Pulsation pedal pulse exam of both lower legs/feet is normal as well.       Assessment and Plan  Kd was seen today for well adult and care gap management.    Diagnoses and all orders for this visit:    Well adult exam  -     CBC With Differential With Platelet; Future  -     Comp Metabolic Panel (14); Future  -     TSH and Free T4; Future  -     Lipid Panel; Future  -     Hemoglobin A1C; Future  -     Microalb/Creat Ratio, Random Urine; Future  -     ThinPrep PAP Smear; Future  -     Hpv Dna  High Risk , Thin Prep Collect; Future  -     Microalb/Creat Ratio, Random Urine  -     ThinPrep PAP Smear  -     Hpv Dna  High Risk , Thin Prep Collect    Cervical smear, as part of routine gynecological examination  -     ThinPrep PAP Smear; Future  -     Hpv Dna  High Risk , Thin Prep Collect; Future  -     ThinPrep PAP Smear  -     Hpv Dna  High Risk , Thin Prep Collect    DUB (dysfunctional uterine bleeding)  -     US PELVIS (TRANSABDOMINAL AND TRANSVAGINAL) (CPT=76856/51469); Future    Type 2 diabetes mellitus without complication, without long-term current use of insulin (HCC)  -     Hemoglobin A1C; Future  -     Microalb/Creat Ratio, Random Urine; Future  -     Microalb/Creat Ratio, Random Urine           Follow up  No follow-ups on file.      Patient Instructions  There are no Patient Instructions on file for this visit.       Jennifer Deleon MD

## 2024-07-11 LAB — HPV E6+E7 MRNA CVX QL NAA+PROBE: NEGATIVE

## 2024-07-15 LAB
.: NORMAL
.: NORMAL

## 2024-07-31 ENCOUNTER — OFFICE VISIT (OUTPATIENT)
Dept: FAMILY MEDICINE CLINIC | Facility: CLINIC | Age: 29
End: 2024-07-31
Payer: COMMERCIAL

## 2024-07-31 VITALS
BODY MASS INDEX: 47 KG/M2 | DIASTOLIC BLOOD PRESSURE: 82 MMHG | TEMPERATURE: 97 F | SYSTOLIC BLOOD PRESSURE: 128 MMHG | WEIGHT: 272.19 LBS | HEART RATE: 84 BPM | RESPIRATION RATE: 22 BRPM | OXYGEN SATURATION: 98 %

## 2024-07-31 DIAGNOSIS — E11.9 CONTROLLED TYPE 2 DIABETES MELLITUS WITHOUT COMPLICATION, WITHOUT LONG-TERM CURRENT USE OF INSULIN (HCC): Primary | ICD-10-CM

## 2024-07-31 DIAGNOSIS — E78.2 MIXED HYPERLIPIDEMIA: ICD-10-CM

## 2024-07-31 DIAGNOSIS — R79.89 ELEVATED SERUM CREATININE: ICD-10-CM

## 2024-07-31 DIAGNOSIS — R74.8 ELEVATED LIVER ENZYMES: ICD-10-CM

## 2024-07-31 LAB
ALBUMIN SERPL-MCNC: 5.1 G/DL (ref 3.2–4.8)
ALBUMIN/GLOB SERPL: 1.4 {RATIO} (ref 1–2)
ALP LIVER SERPL-CCNC: 100 U/L
ALT SERPL-CCNC: 24 U/L
ANION GAP SERPL CALC-SCNC: 3 MMOL/L (ref 0–18)
AST SERPL-CCNC: 29 U/L (ref ?–34)
BILIRUB SERPL-MCNC: 0.4 MG/DL (ref 0.3–1.2)
BUN BLD-MCNC: 11 MG/DL (ref 9–23)
CALCIUM BLD-MCNC: 9.8 MG/DL (ref 8.7–10.4)
CHLORIDE SERPL-SCNC: 106 MMOL/L (ref 98–112)
CO2 SERPL-SCNC: 28 MMOL/L (ref 21–32)
CREAT BLD-MCNC: 0.96 MG/DL
EGFRCR SERPLBLD CKD-EPI 2021: 83 ML/MIN/1.73M2 (ref 60–?)
FASTING STATUS PATIENT QL REPORTED: YES
GLOBULIN PLAS-MCNC: 3.6 G/DL (ref 2–3.5)
GLUCOSE BLD-MCNC: 104 MG/DL (ref 70–99)
OSMOLALITY SERPL CALC.SUM OF ELEC: 284 MOSM/KG (ref 275–295)
POTASSIUM SERPL-SCNC: 4.4 MMOL/L (ref 3.5–5.1)
PROT SERPL-MCNC: 8.7 G/DL (ref 5.7–8.2)
SODIUM SERPL-SCNC: 137 MMOL/L (ref 136–145)

## 2024-07-31 PROCEDURE — 3061F NEG MICROALBUMINURIA REV: CPT | Performed by: FAMILY MEDICINE

## 2024-07-31 PROCEDURE — 3074F SYST BP LT 130 MM HG: CPT | Performed by: FAMILY MEDICINE

## 2024-07-31 PROCEDURE — 3044F HG A1C LEVEL LT 7.0%: CPT | Performed by: FAMILY MEDICINE

## 2024-07-31 PROCEDURE — 80053 COMPREHEN METABOLIC PANEL: CPT | Performed by: FAMILY MEDICINE

## 2024-07-31 PROCEDURE — 3079F DIAST BP 80-89 MM HG: CPT | Performed by: FAMILY MEDICINE

## 2024-07-31 PROCEDURE — 99214 OFFICE O/P EST MOD 30 MIN: CPT | Performed by: FAMILY MEDICINE

## 2024-07-31 PROCEDURE — 86337 INSULIN ANTIBODIES: CPT | Performed by: FAMILY MEDICINE

## 2024-07-31 PROCEDURE — 84681 ASSAY OF C-PEPTIDE: CPT | Performed by: FAMILY MEDICINE

## 2024-07-31 RX ORDER — PRAVASTATIN SODIUM 10 MG
10 TABLET ORAL NIGHTLY
Qty: 90 TABLET | Refills: 0 | Status: SHIPPED | OUTPATIENT
Start: 2024-07-31

## 2024-07-31 RX ORDER — LISINOPRIL 5 MG/1
5 TABLET ORAL DAILY
Qty: 90 TABLET | Refills: 0 | Status: SHIPPED | OUTPATIENT
Start: 2024-07-31

## 2024-07-31 NOTE — PROGRESS NOTES
Chief Complaint   Patient presents with    Follow - Up         HPI  Pt is here for follow up labs. She was placed on metformin for pre diabetes and her A1c is 6.0 indicating that without her medication she is diabetic. Both of her parents are diabetic and pt is likely insulin resistant diabetic but due to her age will check for RADHIKA. She also has elevated serum creatinine and liver enzyme and her cholesterol is high. Pt agrees to starting ACE and statin. She declines diabetic education at this time    Pt agrees to add wegovy to help with sugars and weight        ROS  As per HPI and all other systems reviewed and are negative      Past Medical History:    Anxiety    Anxiety disorder, unspecified    Cannabis use disorder, severe, dependence (HCC)    Depression    Fibroid    Gestational hypertension (HCC)    at 37+ wk.    Hyperlipidemia    MDD (major depressive disorder), recurrent, severe, with psychosis (HCC)    Morbid obesity with BMI of 40.0-44.9, adult (HCC)    [X] MFM US  Monthly growth ultrasounds starting at 28 weeks, add BPP to each growth ultrasound at 32 weeks and beyond  Weekly NSTs at 34 weeks  Limit weight gain to 11-20 pounds.  Delivery at 39-40 weeks        Nonepileptic episode (HCC)    Obesity (BMI 30-39.9)    Psychosis (HCC)    Schizo affective schizophrenia (HCC)    Shaking    Suicidal behavior with attempted self-injury (HCC)       History reviewed. No pertinent surgical history.    Social History     Socioeconomic History    Marital status: Single   Tobacco Use    Smoking status: Never     Passive exposure: Never    Smokeless tobacco: Never   Vaping Use    Vaping status: Never Used   Substance and Sexual Activity    Alcohol use: Not Currently     Comment: occasional    Drug use: Not Currently     Types: Cannabis     Comment: occasional    Sexual activity: Not Currently     Partners: Male   Other Topics Concern    Caffeine Concern Yes    Exercise No       Family History   Problem Relation Age of  Onset    Schizophrenia Father     Schizophrenia Mother     Schizophrenia Paternal Grandmother         Current Outpatient Medications on File Prior to Visit   Medication Sig Dispense Refill    ziprasidone 20 MG Oral Cap Take 1 capsule (20 mg total) by mouth 2 (two) times daily with meals. 180 capsule 2    Norethin Ace-Eth Estrad-FE 1-20 MG-MCG Oral Tab Take 1 tablet by mouth daily. 3 each 2    METFORMIN 500 MG Oral Tab TAKE 1 TABLET(500 MG) BY MOUTH TWICE DAILY WITH MEALS 180 tablet 0    prenatal vitamin with DHA 27-0.8-228 MG Oral Cap Take 1 capsule by mouth daily. (Patient not taking: Reported on 7/10/2024)       No current facility-administered medications on file prior to visit.         Objective  Vitals:    07/31/24 0848   BP: 128/82   Pulse: 84   Resp: 22   Temp: 97 °F (36.1 °C)   SpO2: 98%   Weight: 272 lb 3.2 oz (123.5 kg)     Physical Exam  Constitutional:       Appearance: Normal appearance.   HEENT:      Head: Normocephalic and atraumatic.      Eyes: PERRLA no notable nystagmus     Ears: normal on observation     Nose: Nose normal.      Mouth: Mucous membranes are moist.      Neck: no masses no bruit  Cardiovascular:      Rate and Rhythm: Normal rate and regular rhythm.   Pulmonary:      Effort: Pulmonary effort is normal.      Breath sounds: Normal breath sounds.    Musculoskeletal:         General: Normal range of motion.      Cervical back: Normal range of motion.   Skin:     General: Skin is warm and dry.   Neurological:      General: No focal deficit present.      Mental Status: She is alert and oriented to person, place, and time.   Psychiatric:         Mood and Affect: Mood normal.         Thought Content: Thought content normal.       Assessment and Plan  Kd was seen today for follow - up.    Diagnoses and all orders for this visit:    Controlled type 2 diabetes mellitus without complication, without long-term current use of insulin (HCC)  -     semaglutide-weight management 0.25 MG/0.5ML  Subcutaneous Solution Auto-injector; Inject 0.5 mL (0.25 mg total) into the skin once a week for 4 doses.  -     Microalb/Creat Ratio, Random Urine [E]; Future  -     C-Peptide [E]; Future  -     Insulin Antibodies [E]; Future  -     pravastatin 10 MG Oral Tab; Take 1 tablet (10 mg total) by mouth nightly.  -     lisinopril 5 MG Oral Tab; Take 1 tablet (5 mg total) by mouth daily.  -     Diabetic Test Strips and Supplies  -     C-Peptide [E]  -     Insulin Antibodies [E]    Mixed hyperlipidemia  -     pravastatin 10 MG Oral Tab; Take 1 tablet (10 mg total) by mouth nightly.    Elevated serum creatinine  -     lisinopril 5 MG Oral Tab; Take 1 tablet (5 mg total) by mouth daily.  -     Comp Metabolic Panel (14) [E]; Future  -     Comp Metabolic Panel (14) [E]    Elevated liver enzymes  -     Comp Metabolic Panel (14) [E]; Future  -     Comp Metabolic Panel (14) [E]       Medication side effects and benefits discussed with patient.  Patient advised to read the drug insert for any further information and call with questions      Follow up  Return in about 3 weeks (around 8/21/2024).      Patient Instructions  There are no Patient Instructions on file for this visit.       Jennifer Deleon MD

## 2024-08-01 LAB — C-PEPTIDE: 4.4 NG/ML

## 2024-08-02 ENCOUNTER — TELEPHONE (OUTPATIENT)
Dept: FAMILY MEDICINE CLINIC | Facility: CLINIC | Age: 29
End: 2024-08-02

## 2024-08-02 NOTE — TELEPHONE ENCOUNTER
Pt is schedule. Thanks    Received fax from [employer] regarding PA request for Rx Semaglutide-Weight Management 0.25 MG/0.5ML Subcutaneous Solution Auto-injector (Wegovy)     ePA requested via epic   Unable to complete [facility]    Dr. Tatum to complete and sign [place] Medicaid Pharmacy [place] Request Form  (Placed on Dr. Tatum's desk)    Need to fax to #[phone]

## 2024-08-05 NOTE — TELEPHONE ENCOUNTER
Per Dr. Tatum:   Pt does not qualify for new GLP 1. Have her follow up to discuss alternate       Advised patient of Dr. Tatum's note above. Patient verbalized understanding, already has future appt scheduled. No further questions at this time.    Future Appointments   Date Time Provider Department Center   8/12/2024  9:30 PM Cinthya Alonzo APRN [facility]   8/14/2024 11:00 AM [facility] [place]   8/21/2024 11:20 AM Jennifer Deleon MD [facility]

## 2024-08-10 LAB — INSULIN ABS: 5.9 UU/ML

## 2024-11-01 DIAGNOSIS — R79.89 ELEVATED SERUM CREATININE: ICD-10-CM

## 2024-11-01 DIAGNOSIS — E11.9 CONTROLLED TYPE 2 DIABETES MELLITUS WITHOUT COMPLICATION, WITHOUT LONG-TERM CURRENT USE OF INSULIN (HCC): ICD-10-CM

## 2024-11-02 RX ORDER — LISINOPRIL 5 MG/1
5 TABLET ORAL DAILY
Qty: 90 TABLET | Refills: 0 | Status: SHIPPED | OUTPATIENT
Start: 2024-11-02

## 2024-11-02 NOTE — TELEPHONE ENCOUNTER
Hypertension Medications Protocol Iuzpkt8211/01/2024 06:03 PM   Protocol Details CMP or BMP in past 12 months    Last BP reading less than 140/90    In person appointment or virtual visit in the past 12 mos or appointment in next 3 mos    EGFRCR or GFRAA > 50

## 2024-11-07 DIAGNOSIS — E11.9 CONTROLLED TYPE 2 DIABETES MELLITUS WITHOUT COMPLICATION, WITHOUT LONG-TERM CURRENT USE OF INSULIN (HCC): ICD-10-CM

## 2024-11-07 DIAGNOSIS — E78.2 MIXED HYPERLIPIDEMIA: ICD-10-CM

## 2024-11-07 RX ORDER — PRAVASTATIN SODIUM 10 MG
10 TABLET ORAL NIGHTLY
Qty: 90 TABLET | Refills: 0 | Status: SHIPPED | OUTPATIENT
Start: 2024-11-07

## 2024-11-07 NOTE — TELEPHONE ENCOUNTER
PRAVASTATIN 10MG TABLETS     LOV 7/31/2024  Last Px: 7/10/2024  Last labs 7/10/2024  Last refill on 7/31/2024, for #90, with 0 refills    No future appointments.  Cholesterol Medication Protocol Dluyrr1211/07/2024 10:19 AM   Protocol Details ALT < 80    ALT resulted within past year    Lipid panel within past 12 months    In person appointment or virtual visit in the past 12 mos or appointment in next 3 mos

## 2024-11-26 ENCOUNTER — APPOINTMENT (OUTPATIENT)
Dept: GENERAL RADIOLOGY | Age: 29
End: 2024-11-26
Attending: NURSE PRACTITIONER
Payer: OTHER MISCELLANEOUS

## 2024-11-26 ENCOUNTER — HOSPITAL ENCOUNTER (OUTPATIENT)
Age: 29
Discharge: HOME | End: 2024-11-26
Payer: OTHER MISCELLANEOUS

## 2024-11-26 VITALS
SYSTOLIC BLOOD PRESSURE: 134 MMHG | OXYGEN SATURATION: 98 % | DIASTOLIC BLOOD PRESSURE: 85 MMHG | TEMPERATURE: 98 F | RESPIRATION RATE: 18 BRPM | HEART RATE: 104 BPM

## 2024-11-26 DIAGNOSIS — S62.525A CLOSED NONDISPLACED FRACTURE OF DISTAL PHALANX OF LEFT THUMB, INITIAL ENCOUNTER: Primary | ICD-10-CM

## 2024-11-26 PROCEDURE — L3924 HFO WITHOUT JOINTS PRE OTS: HCPCS | Performed by: NURSE PRACTITIONER

## 2024-11-26 PROCEDURE — 73130 X-RAY EXAM OF HAND: CPT | Performed by: NURSE PRACTITIONER

## 2024-11-26 PROCEDURE — 99203 OFFICE O/P NEW LOW 30 MIN: CPT | Performed by: NURSE PRACTITIONER

## 2024-11-27 NOTE — DISCHARGE INSTRUCTIONS
Rest, ice and elevate as much as possible over the next few days.   Wear the splint as instructed.   Take Tylenol and/or ibuprofen as needed for pain control.   Follow up with [facility] in the next 3-5 days.     Thank you for choosing [facility] for your care.

## 2024-12-03 NOTE — TELEPHONE ENCOUNTER
Drospirenone-Ethinyl Estradiol 3-0.02 MG Oral Tab   Gynecology Medication Protocol Passed 12/03/2024 08:45 AM   Protocol Details PASS-PENDING LAST PAP WNL--VIA MANUAL LOOKUP    Physical or Pelvic/Breast in past 12 or next 3 mos--VIA MANUAL LOOKUP     LOV 7/31/2024  Last PX:7/10/2024  Last refill on 5/26/2023-8/24/2023, for #28, with 5 refills    Future Appointments   Date Time Provider Department Center   12/19/2024 12:20 PM Cinthya Alonzo APRN [facility]

## 2024-12-03 NOTE — TELEPHONE ENCOUNTER
Received fax from [employer] requesting refill for Drospirenone/Ethy Est 3/0.02 MG T 28.

## 2024-12-04 RX ORDER — DROSPIRENONE AND ETHINYL ESTRADIOL 0.02-3(28)
1 KIT ORAL DAILY
Qty: 28 TABLET | Refills: 0 | Status: SHIPPED | OUTPATIENT
Start: 2024-12-04 | End: 2025-11-29

## 2024-12-05 NOTE — TELEPHONE ENCOUNTER
Called Pt to confirm if they had gotten a diabetic eye exam done this year. Pt stated they went to [facility] in [place], [place] for their eye exam, but are unsure if thy did a diabetic eye exam.     Called the [facility], and spoke to an associate. The associate stated the patient had came in for a regular eye exam, but would ask the optometrist and fax any revelant information and call.    Awaiting call and/or fax.

## 2024-12-17 ENCOUNTER — TELEPHONE (OUTPATIENT)
Dept: FAMILY MEDICINE CLINIC | Facility: CLINIC | Age: 29
End: 2024-12-17

## 2024-12-17 NOTE — TELEPHONE ENCOUNTER
Advised patient of Dr. Tatum's note below. Patient verbalized understanding and stated she is taking care of this through workman's comp.   Patient declines appt with Dr. Tatum at this time, but will call for appt if needed. No further questions at this time.

## 2024-12-17 NOTE — TELEPHONE ENCOUNTER
Jennifer Deleon MD  P Jennifer Deleon Nurse  Check if she needs follow up or is she seeing hand surgeon. Thank you          Previous Messages       ----- Message -----  From: Opal Wang APRN ([facility])  Sent: 12/11/2024   3:08 PM CST  To: Jennifer Deleon MD ([facility])  Subject: FW: Emergency Discharge on 11/27/2024      ----- Message -----  From: Mike Savage ([facility])  Sent: 11/27/2024   8:03 PM CST  To: Ruth Nash MD ([facility])  Subject: Emergency Discharge on 11/27/2024

## 2025-01-08 ENCOUNTER — NURSE ONLY (OUTPATIENT)
Dept: FAMILY MEDICINE CLINIC | Facility: CLINIC | Age: 30
End: 2025-01-08
Payer: MEDICAID

## 2025-01-08 DIAGNOSIS — E11.9 CONTROLLED TYPE 2 DIABETES MELLITUS WITHOUT COMPLICATION, WITHOUT LONG-TERM CURRENT USE OF INSULIN (HCC): Primary | ICD-10-CM

## 2025-01-08 PROCEDURE — 92229 IMG RTA DETC/MNTR DS POC ALY: CPT | Performed by: FAMILY MEDICINE

## 2025-01-08 NOTE — PROGRESS NOTES
Do you have any vision loss that cannot be corrected (with eye glasses), blurred vision or floaters? No  Are you currently pregnant?  No   If questionable we can preform in office urine testing per your request  Do you have a diagnosis of macular edema, severe non-proliferative retinopathy, proliferative retinopathy, radiation retinopathy, or retinal vein occlusion? No  Have you had laser treatment of the retina or injections into either eye, or any history of retinal surgery?  No    This test may be contraindicated for fundus imaging if you:  Are hypersensitive to light  Taking medications that cause photosensitivity  Recently underwent photodynamic therapy (PDT)   *this is a type of phototherapy used to  elicit cell death for treatment of disease, including age-related macular degeneration.    Please call [facility] [phone] for scheduling.

## 2025-01-08 NOTE — PROGRESS NOTES
DM eye exam completed  NO diabetic retinopathy detected - patient verbalized understanding. Printed results and given to patient.  No further questions. Patient left office in stable condition.

## 2025-01-30 RX ORDER — DROSPIRENONE AND ETHINYL ESTRADIOL 0.02-3(28)
1 KIT ORAL DAILY
Qty: 28 TABLET | Refills: 0 | Status: SHIPPED | OUTPATIENT
Start: 2025-01-30 | End: 2026-01-25

## 2025-01-30 NOTE — TELEPHONE ENCOUNTER
Gynecology Medication Protocol Passed 01/30/2025 10:42 AM   Protocol Details PASS-PENDING LAST PAP WNL--VIA MANUAL LOOKUP    Medication is active on med list    Physical or Pelvic/Breast in past 12 or next 3 mos--VIA MANUAL LOOKUP

## 2025-05-08 RX ORDER — DROSPIRENONE AND ETHINYL ESTRADIOL 0.02-3(28)
1 KIT ORAL DAILY
Qty: 28 TABLET | Refills: 0 | Status: SHIPPED | OUTPATIENT
Start: 2025-05-08 | End: 2026-05-03

## 2025-05-08 NOTE — TELEPHONE ENCOUNTER
Gynecology Medication Protocol Passed 05/08/2025 03:16 PM   Protocol Details PASS-PENDING LAST PAP WNL--VIA MANUAL LOOKUP    Medication is active on med list    Physical or Pelvic/Breast in past 12 or next 3 mos--VIA MANUAL LOOKUP

## 2025-05-08 NOTE — TELEPHONE ENCOUNTER
PT CALLED AND ADV NEEDS REFILL OF HER BIRTH CONTROL     Drospirenone-Ethinyl Estradiol 3-0.02 MG Oral Tab     PLEASE SEND TO [facility]    THANK YOU

## 2025-07-10 RX ORDER — DROSPIRENONE AND ETHINYL ESTRADIOL 0.02-3(28)
1 KIT ORAL DAILY
Qty: 28 TABLET | Refills: 0 | OUTPATIENT
Start: 2025-07-10 | End: 2026-07-05

## 2025-08-11 ENCOUNTER — TELEPHONE (OUTPATIENT)
Dept: FAMILY MEDICINE CLINIC | Facility: CLINIC | Age: 30
End: 2025-08-11

## 2025-08-14 ENCOUNTER — MED REC SCAN ONLY (OUTPATIENT)
Dept: FAMILY MEDICINE CLINIC | Facility: CLINIC | Age: 30
End: 2025-08-14

## (undated) NOTE — MR AVS SNAPSHOT
[address] [address]  [phone]               Thank you for choosing us for your health care visit with Luz Nielsen MD.  We are glad to serve you and happy to provide you with this sum Results of Recent Testing     LIPID PANEL      Component Value Standard Range & Units    Cholesterol, Total 200 <190 mg/dL      Reference ranges are based on NHLBI Guidelines:     Acceptable:      <190 mg/dL     Borderline high: 190- 224 mg/dL     High: your Zip Code and Date of Birth to complete the sign-up process. If you do not sign up before the expiration date, you must request a new code.     Your unique [employer] Access Code: [email or web address]  Expires: 6/20/2017  2:09 PM    If you have questions, you can c

## (undated) NOTE — ED AVS SNAPSHOT
THE [facility] in [facility] [address] [address]    Phone:  [phone]    Fax:  Jocelyne Wharton   MRN: [id number]    Department:  THE [facility] in [place]   Date of Visit:  4/21/2017           Diag Cr was checked today - this is normal    Urine dip with mild white cells noted in the urine - Urine Cx sent for this to rule out true UTI.    Tylenol / Motrin as needed for pain and inflammation   Follow up with dentist regarding [place] tooth - no signs of this physician (or your personal doctor if your instructions are to return to your personal doctor) about any new or lasting problems. The primary care or specialist physician will see patients referred from the [facility].  Follow-up care is at you to explore options for quitting.     - If you have concerns related to behavioral health issues or thoughts of harming yourself, contact [facility] at [phone].     - If you don’t have insurance, Hilary Ashford

## (undated) NOTE — LETTER
23          RE:  Ryan Adan  :  10/15/1995      To Whom It May Concern:    Ryan Adan  is currently under our care for pregnancy. It is permissible at her gestational age for dental work to be performed. However, if x-rays are needed, please make sure that the abdomen is shielded appropriately, and thoroughly. Analgesia is also permissible, as long as there is no vasoconstrictor used. For post-treatment pain relief, Tylenol alone is acceptable. If an antibiotic is needed, a penicillin derivative may be used. If you have any additional concerns, do not hesitate to contact our office.     Sincerely,        Wen Maurer MD

## (undated) NOTE — LETTER
Dear New MomChester, we missed you! The nurses of [facility] have tried to reach you by phone to ask if you have any questions regarding your health or the health and care of your new little one.    We hope you are doing well. If, for any reason, you have questions or concerns about your health or your baby’s health, please contact your provider or your pediatrician or family medicine physician regarding your baby.     At [facility], we feel that postpartum support is very important for new families. Please see the enclosed new parent support flyer that lists support programs and resources with both in-person and online options.     Additionally, our Breastfeeding Centers at [facility] and [facility] in [place], offer outpatient visits with our International Board-Certified Lactation   Consultants (IBCLCs) for any breastfeeding concerns or questions you may have.    For issues related to stress, anxiety or depression, we have a Nurturing Mom support group that meets both in-person or online.  There’s also a 24-hour Mom’s Line where you can request a phone call from a clinical therapist for assistance for postpartum depression.    We encourage you to take advantage of these programs and resources as you recover from childbirth and learn to care for your new infant.    Best wishes,    [facility] Nurs[facility]            [id number]

## (undated) NOTE — LETTER
[address] [facility]  [address]  [facility] [address]  [phone]                7/26/2019        Sisi Rao  [address] [place] [address]      Dear Sisi Rao.     To help us

## (undated) NOTE — LETTER
[address]  [address] [address]           Dear Дмитрий Valentine records indicate that you have outstanding lab work and or testing that was ordered for you and has not yet been completed:  Lab Frequency Next Occurrence   HEMOGLOBIN A1C Once 06/29/2023   LIPID PANEL Once 44/13/0853   COMP METABOLIC PANEL (14) Once 54/29/8145      To provide you with the best possible care, please complete these orders at your earliest convenience. If you have recently completed these orders please disregard this letter. If you have any questions please call the office at [phone].      Thank you,     [facility]

## (undated) NOTE — LETTER
23          RE:  Deshawn Valera  :  10/15/1995      To Whom It May Concern:    Deshawn Valera  is currently under our care for pregnancy.  It is permissible at her gestational age for dental work to be performed.  However, if x-rays are needed, please make sure that the abdomen is shielded appropriately, and thoroughly.      Analgesia is also permissible, as long as there is no vasoconstrictor used.  For post-treatment pain relief, Tylenol alone is acceptable.  If an antibiotic is needed, a penicillin derivative may be used.    If you have any additional concerns, do not hesitate to contact our office.    Sincerely,        Yvonne Angeles MD

## (undated) NOTE — LETTER
WORK STATUS WORKSHEET    Date & Time: 11/26/2024, 8:03 PM  Patient: Catiamorelialynsey Valera  Encounter Provider(s):    Miya Linton APRN     Diagnosis:    1. Closed nondisplaced fracture of distal phalanx of left thumb, initial encounter        Next Appointment Date/Time: Call to make an appointment at [facility] in [place].    Modified Duty until cleared by [facility] or Orthopedics.        Follow Up:  See above    Prescription for:  None    After Care Instructions:  Apply cold packs to injury site    Restrictions:  Lumbar Back Strain:      Upper Extremities:  No work using left hand  Lower Extremities:   Wound:    Eye:    Other-General:  Wear splint while at work      Above instructions reviewed with Deshawn Valera.  She verbalized understanding.      Electronically signed by JOHANNA Escobar  _____________________________  Physician/APC Signature

## (undated) NOTE — MR AVS SNAPSHOT
After Visit Summary   12/6/2021    Yamilex Larios   MRN: [id number]           Visit Information     Date & Time  12/6/2021  1:00 PM Provider  Philip Ng MD [address] [facility] Dept.  Lise CHLAMYDIA/GONOCOCCUS, MISTY [[id number] CUSTOM]  12/6/2021 26/8/7725    COMP METABOLIC PANEL (14) [[id number] CUSTOM]  12/6/2021 (Approximate) 12/6/2022    HEMOGLOBIN A1C [[id number] CUSTOM]  12/6/2021 (Approximate) 12/6/2022    LIPID PANEL [[id number] CUSTOM]  12/6/202

## (undated) NOTE — MR AVS SNAPSHOT
[address] [facility] [id number] [phone]               Thank you for choosing us for your health care visit with [facility].   We are glad to serve you and happy to provide you with this not sign up before the expiration date, you must request a new code. Your unique [employer] Access Code: [email or web address]  Expires: 6/20/2017  2:09 PM    If you have questions, you can call [phone] to talk to our [facility] Staff.  Remember, [employer]

## (undated) NOTE — IP AVS SNAPSHOT
[address]            (For Outpatient Use Only) Initial Admit Date: 10/25/2022   Inpt/Obs Admit Date: Inpt: N/A / Obs: 10/25/22   Discharge Date:    Hospital Acct:  [de-identified]   MRN: [de-identified]   CSN: [id number]   CEID: [id number]        ENCOUNTER  Patient Class: Observation Admitting Provider: Luis F Davis MD Unit: [address] Service: Med/Behavioral Attending Provider: Luis F Davis MD   Bed: 6129-N   Visit Type:   Referring Physician: No ref. provider found Billing Flag:    Admit Diagnosis: Altered mental status, unspecified altered mental status type [R41.82]      PATIENT  Legal Name:   Gio Gage   Legal Sex: Female  Gender ID: Female             Pref Name:   [facility] PCP:  Mkie Lin [address]* Home: [phone]   Address:  [address] : 10/15/1995 (27 yrs) Mobile: [phone]; [phone]         City/State/Zip: [place] [address] Marital: Single Language: Tayler Kody: [place] SSN4: [id number] Islam: Oriental orthodox - No Visit     Race: Black Or  Ethnicity: Non  Or [address] CONTACT   Name Relationship Legal Guardian? Home Phone Work Phone Mobile Phone   1.  Sherine Valera  2. *No Contact Specified* Mother      [phone]: Declan Santiago : 10/15/1995 Home Phone: [phone]   Address: [address]  Sex: Female Work Phone:    City/State/Zip: [address]   Rel. to Patient: Self Guarantor ID: [id number]   [address]   Employer: [employer] Status: FULL TIME     COVERAGE  PRIMARY INSURANCE   Payor: [facility]: [address] [place] [address]   Group Number: [id number] Insurance Type: [address] Name: Kev Witt : 10/15/1995   Subscriber ID: [id number] Pt Rel to Subscriber: Self   SECONDARY INSURANCE   Payor:  Plan:    Group Number:  Insurance Type:    Subscriber Name:  Subscriber :    Subscriber ID:  Pt Rel to Subscriber: TERTIARY INSURANCE   Payor:  Plan:    Group Number:  Insurance Type:    Subscriber Name:  Subscriber :    Subscriber ID:  Pt Rel to Subscriber:    Hospital Account Financial Class: [facility]    2022

## (undated) NOTE — LETTER
24      Deshawn Valera        :  10/15/1995        To Whom It May Concern:    Deshawn Valera  has recently been treated for a medical condition. At this time, I have determined she may return to work effective 3/20/2024, without restrictions    If you have any questions, please do not hesitate to contact our office at any time.    Sincerely,        JOHANNA Curran